# Patient Record
Sex: MALE | Race: WHITE | NOT HISPANIC OR LATINO
[De-identification: names, ages, dates, MRNs, and addresses within clinical notes are randomized per-mention and may not be internally consistent; named-entity substitution may affect disease eponyms.]

---

## 2017-01-11 ENCOUNTER — RX RENEWAL (OUTPATIENT)
Age: 77
End: 2017-01-11

## 2017-01-19 ENCOUNTER — LABORATORY RESULT (OUTPATIENT)
Age: 77
End: 2017-01-19

## 2017-01-25 ENCOUNTER — CLINICAL ADVICE (OUTPATIENT)
Age: 77
End: 2017-01-25

## 2017-02-10 ENCOUNTER — APPOINTMENT (OUTPATIENT)
Dept: HEART AND VASCULAR | Facility: CLINIC | Age: 77
End: 2017-02-10

## 2017-02-10 VITALS
SYSTOLIC BLOOD PRESSURE: 140 MMHG | HEART RATE: 73 BPM | BODY MASS INDEX: 23.66 KG/M2 | OXYGEN SATURATION: 98 % | DIASTOLIC BLOOD PRESSURE: 70 MMHG | RESPIRATION RATE: 12 BRPM | HEIGHT: 65 IN | WEIGHT: 142 LBS

## 2017-03-10 ENCOUNTER — APPOINTMENT (OUTPATIENT)
Dept: HEART AND VASCULAR | Facility: CLINIC | Age: 77
End: 2017-03-10

## 2017-03-10 VITALS
RESPIRATION RATE: 12 BRPM | TEMPERATURE: 96.6 F | SYSTOLIC BLOOD PRESSURE: 120 MMHG | WEIGHT: 143 LBS | HEART RATE: 61 BPM | OXYGEN SATURATION: 98 % | BODY MASS INDEX: 23.8 KG/M2 | DIASTOLIC BLOOD PRESSURE: 60 MMHG

## 2017-03-15 LAB
ALBUMIN SERPL ELPH-MCNC: 4.6 G/DL
ALP BLD-CCNC: 76 U/L
ALT SERPL-CCNC: 27 U/L
ANION GAP SERPL CALC-SCNC: 15 MMOL/L
AST SERPL-CCNC: 27 U/L
BASOPHILS # BLD AUTO: 0.02 K/UL
BASOPHILS NFR BLD AUTO: 0.4 %
BILIRUB SERPL-MCNC: 0.7 MG/DL
BUN SERPL-MCNC: 21 MG/DL
CALCIUM SERPL-MCNC: 9.5 MG/DL
CHLORIDE SERPL-SCNC: 102 MMOL/L
CHOLEST SERPL-MCNC: 192 MG/DL
CHOLEST/HDLC SERPL: 4 RATIO
CO2 SERPL-SCNC: 26 MMOL/L
CREAT SERPL-MCNC: 1.06 MG/DL
EOSINOPHIL # BLD AUTO: 0.1 K/UL
EOSINOPHIL NFR BLD AUTO: 1.8 %
GLUCOSE SERPL-MCNC: 101 MG/DL
HBA1C MFR BLD HPLC: 5.8 %
HCT VFR BLD CALC: 40.3 %
HDLC SERPL-MCNC: 48 MG/DL
HGB BLD-MCNC: 13.4 G/DL
IMM GRANULOCYTES NFR BLD AUTO: 0.4 %
LDLC SERPL CALC-MCNC: 103 MG/DL
LYMPHOCYTES # BLD AUTO: 2.09 K/UL
LYMPHOCYTES NFR BLD AUTO: 36.8 %
MAN DIFF?: NORMAL
MCHC RBC-ENTMCNC: 31.5 PG
MCHC RBC-ENTMCNC: 33.3 GM/DL
MCV RBC AUTO: 94.6 FL
MONOCYTES # BLD AUTO: 0.5 K/UL
MONOCYTES NFR BLD AUTO: 8.8 %
NEUTROPHILS # BLD AUTO: 2.95 K/UL
NEUTROPHILS NFR BLD AUTO: 51.8 %
PLATELET # BLD AUTO: 268 K/UL
POTASSIUM SERPL-SCNC: 3.5 MMOL/L
PROT SERPL-MCNC: 7.2 G/DL
RBC # BLD: 4.26 M/UL
RBC # FLD: 13.5 %
SODIUM SERPL-SCNC: 143 MMOL/L
TRIGL SERPL-MCNC: 207 MG/DL
WBC # FLD AUTO: 5.68 K/UL

## 2017-04-17 ENCOUNTER — APPOINTMENT (OUTPATIENT)
Dept: HEART AND VASCULAR | Facility: CLINIC | Age: 77
End: 2017-04-17

## 2017-04-17 VITALS
RESPIRATION RATE: 12 BRPM | OXYGEN SATURATION: 98 % | DIASTOLIC BLOOD PRESSURE: 70 MMHG | TEMPERATURE: 98.3 F | HEART RATE: 67 BPM | SYSTOLIC BLOOD PRESSURE: 120 MMHG | WEIGHT: 143 LBS | BODY MASS INDEX: 23.8 KG/M2

## 2017-04-18 LAB
ALBUMIN SERPL ELPH-MCNC: 4.4 G/DL
ALP BLD-CCNC: 72 U/L
ALT SERPL-CCNC: 22 U/L
ANION GAP SERPL CALC-SCNC: 19 MMOL/L
AST SERPL-CCNC: 22 U/L
BILIRUB SERPL-MCNC: 0.6 MG/DL
BUN SERPL-MCNC: 25 MG/DL
CALCIUM SERPL-MCNC: 9.6 MG/DL
CHLORIDE SERPL-SCNC: 101 MMOL/L
CHOLEST SERPL-MCNC: 184 MG/DL
CHOLEST/HDLC SERPL: 4.1 RATIO
CO2 SERPL-SCNC: 23 MMOL/L
CREAT SERPL-MCNC: 1.15 MG/DL
GLUCOSE SERPL-MCNC: 105 MG/DL
HBA1C MFR BLD HPLC: 5.8 %
HDLC SERPL-MCNC: 45 MG/DL
LDLC SERPL CALC-MCNC: 101 MG/DL
POTASSIUM SERPL-SCNC: 3.4 MMOL/L
PROT SERPL-MCNC: 7 G/DL
PSA SERPL-MCNC: <0.01 NG/ML
SODIUM SERPL-SCNC: 143 MMOL/L
TRIGL SERPL-MCNC: 188 MG/DL

## 2017-04-24 ENCOUNTER — RX RENEWAL (OUTPATIENT)
Age: 77
End: 2017-04-24

## 2017-05-10 ENCOUNTER — APPOINTMENT (OUTPATIENT)
Dept: HEART AND VASCULAR | Facility: CLINIC | Age: 77
End: 2017-05-10

## 2017-05-10 VITALS
OXYGEN SATURATION: 98 % | HEART RATE: 66 BPM | DIASTOLIC BLOOD PRESSURE: 66 MMHG | WEIGHT: 143 LBS | RESPIRATION RATE: 12 BRPM | TEMPERATURE: 98.3 F | HEIGHT: 65 IN | SYSTOLIC BLOOD PRESSURE: 120 MMHG | BODY MASS INDEX: 23.82 KG/M2

## 2017-05-10 VITALS — DIASTOLIC BLOOD PRESSURE: 70 MMHG | SYSTOLIC BLOOD PRESSURE: 150 MMHG

## 2017-05-10 RX ORDER — POTASSIUM CHLORIDE 1500 MG/1
20 TABLET, EXTENDED RELEASE ORAL DAILY
Qty: 90 | Refills: 0 | Status: DISCONTINUED | COMMUNITY
Start: 2017-04-20 | End: 2017-05-10

## 2017-05-11 ENCOUNTER — APPOINTMENT (OUTPATIENT)
Dept: HEART AND VASCULAR | Facility: CLINIC | Age: 77
End: 2017-05-11

## 2017-05-11 VITALS
HEIGHT: 65 IN | WEIGHT: 143 LBS | RESPIRATION RATE: 12 BRPM | TEMPERATURE: 97.9 F | BODY MASS INDEX: 23.82 KG/M2 | DIASTOLIC BLOOD PRESSURE: 72 MMHG | SYSTOLIC BLOOD PRESSURE: 122 MMHG | OXYGEN SATURATION: 96 % | HEART RATE: 74 BPM

## 2017-05-11 VITALS — SYSTOLIC BLOOD PRESSURE: 140 MMHG | DIASTOLIC BLOOD PRESSURE: 68 MMHG

## 2017-05-23 ENCOUNTER — APPOINTMENT (OUTPATIENT)
Dept: HEART AND VASCULAR | Facility: CLINIC | Age: 77
End: 2017-05-23

## 2017-06-08 ENCOUNTER — RX RENEWAL (OUTPATIENT)
Age: 77
End: 2017-06-08

## 2017-06-26 ENCOUNTER — RX RENEWAL (OUTPATIENT)
Age: 77
End: 2017-06-26

## 2017-08-23 ENCOUNTER — RX RENEWAL (OUTPATIENT)
Age: 77
End: 2017-08-23

## 2017-09-15 ENCOUNTER — APPOINTMENT (OUTPATIENT)
Dept: HEART AND VASCULAR | Facility: CLINIC | Age: 77
End: 2017-09-15
Payer: MEDICARE

## 2017-09-15 VITALS
RESPIRATION RATE: 12 BRPM | HEIGHT: 65 IN | OXYGEN SATURATION: 98 % | HEART RATE: 67 BPM | SYSTOLIC BLOOD PRESSURE: 130 MMHG | DIASTOLIC BLOOD PRESSURE: 64 MMHG | WEIGHT: 143 LBS | TEMPERATURE: 98 F | BODY MASS INDEX: 23.82 KG/M2

## 2017-09-15 DIAGNOSIS — Z87.898 PERSONAL HISTORY OF OTHER SPECIFIED CONDITIONS: ICD-10-CM

## 2017-09-15 DIAGNOSIS — Z86.39 PERSONAL HISTORY OF OTHER ENDOCRINE, NUTRITIONAL AND METABOLIC DISEASE: ICD-10-CM

## 2017-09-15 PROCEDURE — 99214 OFFICE O/P EST MOD 30 MIN: CPT | Mod: 25

## 2017-09-15 PROCEDURE — 36415 COLL VENOUS BLD VENIPUNCTURE: CPT

## 2017-09-15 PROCEDURE — 93000 ELECTROCARDIOGRAM COMPLETE: CPT

## 2017-09-15 RX ORDER — FOLIC ACID 0.8 MG
800 TABLET ORAL
Qty: 180 | Refills: 0 | Status: ACTIVE | COMMUNITY
Start: 2017-09-15

## 2017-09-18 LAB
25(OH)D3 SERPL-MCNC: 46 NG/ML
ALBUMIN SERPL ELPH-MCNC: 4.8 G/DL
ALP BLD-CCNC: 69 U/L
ALT SERPL-CCNC: 23 U/L
ANION GAP SERPL CALC-SCNC: 16 MMOL/L
AST SERPL-CCNC: 22 U/L
BASOPHILS # BLD AUTO: 0.02 K/UL
BASOPHILS NFR BLD AUTO: 0.3 %
BILIRUB SERPL-MCNC: 0.8 MG/DL
BUN SERPL-MCNC: 27 MG/DL
CALCIUM SERPL-MCNC: 9.8 MG/DL
CHLORIDE SERPL-SCNC: 100 MMOL/L
CHOLEST SERPL-MCNC: 185 MG/DL
CHOLEST/HDLC SERPL: 3.2 RATIO
CO2 SERPL-SCNC: 23 MMOL/L
CREAT SERPL-MCNC: 1.34 MG/DL
EOSINOPHIL # BLD AUTO: 0.06 K/UL
EOSINOPHIL NFR BLD AUTO: 1 %
GLUCOSE SERPL-MCNC: 123 MG/DL
HBA1C MFR BLD HPLC: 5.4 %
HCT VFR BLD CALC: 37.3 %
HDLC SERPL-MCNC: 57 MG/DL
HGB BLD-MCNC: 12.2 G/DL
IMM GRANULOCYTES NFR BLD AUTO: 0.2 %
LDLC SERPL CALC-MCNC: 100 MG/DL
LYMPHOCYTES # BLD AUTO: 1.69 K/UL
LYMPHOCYTES NFR BLD AUTO: 27.1 %
MAN DIFF?: NORMAL
MCHC RBC-ENTMCNC: 31 PG
MCHC RBC-ENTMCNC: 32.7 GM/DL
MCV RBC AUTO: 94.7 FL
MONOCYTES # BLD AUTO: 0.5 K/UL
MONOCYTES NFR BLD AUTO: 8 %
NEUTROPHILS # BLD AUTO: 3.96 K/UL
NEUTROPHILS NFR BLD AUTO: 63.4 %
PLATELET # BLD AUTO: 265 K/UL
POTASSIUM SERPL-SCNC: 4 MMOL/L
PROT SERPL-MCNC: 7.5 G/DL
RBC # BLD: 3.94 M/UL
RBC # FLD: 14 %
SODIUM SERPL-SCNC: 139 MMOL/L
TRIGL SERPL-MCNC: 140 MG/DL
TSH SERPL-ACNC: 3.29 UIU/ML
WBC # FLD AUTO: 6.24 K/UL

## 2017-09-20 ENCOUNTER — APPOINTMENT (OUTPATIENT)
Dept: HEART AND VASCULAR | Facility: CLINIC | Age: 77
End: 2017-09-20
Payer: MEDICARE

## 2017-09-20 PROCEDURE — 78452 HT MUSCLE IMAGE SPECT MULT: CPT

## 2017-09-20 PROCEDURE — A9500: CPT

## 2017-09-20 PROCEDURE — 99213 OFFICE O/P EST LOW 20 MIN: CPT | Mod: 25

## 2017-09-20 PROCEDURE — 93015 CV STRESS TEST SUPVJ I&R: CPT

## 2017-09-25 ENCOUNTER — RX RENEWAL (OUTPATIENT)
Age: 77
End: 2017-09-25

## 2017-10-31 ENCOUNTER — RX RENEWAL (OUTPATIENT)
Age: 77
End: 2017-10-31

## 2018-01-08 ENCOUNTER — RX RENEWAL (OUTPATIENT)
Age: 78
End: 2018-01-08

## 2018-01-23 ENCOUNTER — MEDICATION RENEWAL (OUTPATIENT)
Age: 78
End: 2018-01-23

## 2018-05-30 ENCOUNTER — APPOINTMENT (OUTPATIENT)
Dept: HEART AND VASCULAR | Facility: CLINIC | Age: 78
End: 2018-05-30
Payer: MEDICARE

## 2018-05-30 VITALS
TEMPERATURE: 98 F | RESPIRATION RATE: 12 BRPM | OXYGEN SATURATION: 97 % | DIASTOLIC BLOOD PRESSURE: 68 MMHG | WEIGHT: 144 LBS | HEIGHT: 65 IN | SYSTOLIC BLOOD PRESSURE: 116 MMHG | BODY MASS INDEX: 23.99 KG/M2 | HEART RATE: 63 BPM

## 2018-05-30 PROCEDURE — 99214 OFFICE O/P EST MOD 30 MIN: CPT

## 2018-06-22 ENCOUNTER — APPOINTMENT (OUTPATIENT)
Dept: NEUROSURGERY | Facility: CLINIC | Age: 78
End: 2018-06-22
Payer: MEDICARE

## 2018-06-22 VITALS
SYSTOLIC BLOOD PRESSURE: 145 MMHG | TEMPERATURE: 98.4 F | OXYGEN SATURATION: 99 % | WEIGHT: 146 LBS | RESPIRATION RATE: 16 BRPM | BODY MASS INDEX: 24.32 KG/M2 | HEART RATE: 63 BPM | HEIGHT: 65 IN | DIASTOLIC BLOOD PRESSURE: 74 MMHG

## 2018-06-22 PROCEDURE — 99202 OFFICE O/P NEW SF 15 MIN: CPT

## 2018-12-11 ENCOUNTER — RX RENEWAL (OUTPATIENT)
Age: 78
End: 2018-12-11

## 2019-01-08 ENCOUNTER — RX RENEWAL (OUTPATIENT)
Age: 79
End: 2019-01-08

## 2019-02-11 ENCOUNTER — APPOINTMENT (OUTPATIENT)
Dept: HEART AND VASCULAR | Facility: CLINIC | Age: 79
End: 2019-02-11
Payer: MEDICARE

## 2019-02-11 ENCOUNTER — APPOINTMENT (OUTPATIENT)
Dept: HEART AND VASCULAR | Facility: CLINIC | Age: 79
End: 2019-02-11

## 2019-02-11 VITALS
WEIGHT: 145 LBS | SYSTOLIC BLOOD PRESSURE: 128 MMHG | HEART RATE: 70 BPM | BODY MASS INDEX: 24.16 KG/M2 | TEMPERATURE: 97.4 F | HEIGHT: 65 IN | DIASTOLIC BLOOD PRESSURE: 60 MMHG | RESPIRATION RATE: 16 BRPM | OXYGEN SATURATION: 97 %

## 2019-02-11 DIAGNOSIS — E79.0 HYPERURICEMIA W/OUT SIGNS OF INFLAMMATORY ARTHRITIS AND TOPHACEOUS DISEASE: ICD-10-CM

## 2019-02-11 PROCEDURE — 99214 OFFICE O/P EST MOD 30 MIN: CPT

## 2019-02-11 PROCEDURE — 93306 TTE W/DOPPLER COMPLETE: CPT

## 2019-02-11 PROCEDURE — 93000 ELECTROCARDIOGRAM COMPLETE: CPT

## 2019-02-11 PROCEDURE — 36415 COLL VENOUS BLD VENIPUNCTURE: CPT

## 2019-02-11 RX ORDER — CIPROFLOXACIN 3 MG/ML
0.3 SOLUTION OPHTHALMIC
Qty: 10 | Refills: 0 | Status: DISCONTINUED | COMMUNITY
Start: 2017-09-12 | End: 2019-02-11

## 2019-02-11 RX ORDER — MV-MIN/FOLIC/VIT K/LYCOP/COQ10 200-100MCG
CAPSULE ORAL
Refills: 0 | Status: ACTIVE | COMMUNITY

## 2019-02-11 RX ORDER — MENTHOL/CAMPHOR 0.5 %-0.5%
1000 LOTION (ML) TOPICAL
Refills: 0 | Status: ACTIVE | COMMUNITY

## 2019-02-11 NOTE — PHYSICAL EXAM
[General Appearance - Well Developed] : well developed [Normal Appearance] : normal appearance [Well Groomed] : well groomed [General Appearance - Well Nourished] : well nourished [No Deformities] : no deformities [General Appearance - In No Acute Distress] : no acute distress [Normal Conjunctiva] : the conjunctiva exhibited no abnormalities [] : no respiratory distress [Respiration, Rhythm And Depth] : normal respiratory rhythm and effort [Exaggerated Use Of Accessory Muscles For Inspiration] : no accessory muscle use [Heart Sounds] : normal S1 and S2 [Bowel Sounds] : normal bowel sounds [Abdomen Soft] : soft [Abnormal Walk] : normal gait [FreeTextEntry1] : no edema [Skin Turgor] : normal skin turgor [Oriented To Time, Place, And Person] : oriented to person, place, and time [Affect] : the affect was normal [Mood] : the mood was normal [No Anxiety] : not feeling anxious

## 2019-02-11 NOTE — HISTORY OF PRESENT ILLNESS
[FreeTextEntry1] : 78 year male who saw Dr Andrade of Neurosurgery. She had labs with Dr Reilly and is on Vitamins for homocysteine. He is on Vitamin D, B12, B vitamins, B6 and Folic Acid. He denies having any chest pain or SOB

## 2019-02-11 NOTE — DISCUSSION/SUMMARY
[FreeTextEntry1] : At the time of the patient's visit an Echocardiogram was performed to evaluate his LV function. 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 Labs drawn and sent

## 2019-02-12 LAB
25(OH)D3 SERPL-MCNC: 43.4 NG/ML
ALBUMIN SERPL ELPH-MCNC: 4.9 G/DL
ALP BLD-CCNC: 70 U/L
ALT SERPL-CCNC: 22 U/L
ANION GAP SERPL CALC-SCNC: 12 MMOL/L
AST SERPL-CCNC: 22 U/L
BASOPHILS # BLD AUTO: 0.03 K/UL
BASOPHILS NFR BLD AUTO: 0.4 %
BILIRUB SERPL-MCNC: 0.5 MG/DL
BUN SERPL-MCNC: 32 MG/DL
CALCIUM SERPL-MCNC: 9.8 MG/DL
CHLORIDE SERPL-SCNC: 103 MMOL/L
CHOLEST SERPL-MCNC: 167 MG/DL
CHOLEST/HDLC SERPL: 3.2 RATIO
CK SERPL-CCNC: 78 U/L
CO2 SERPL-SCNC: 27 MMOL/L
CREAT SERPL-MCNC: 1.23 MG/DL
EOSINOPHIL # BLD AUTO: 0.16 K/UL
EOSINOPHIL NFR BLD AUTO: 2.2 %
FOLATE SERPL-MCNC: >20 NG/ML
GLUCOSE SERPL-MCNC: 106 MG/DL
HBA1C MFR BLD HPLC: 5.6 %
HCT VFR BLD CALC: 39.9 %
HDLC SERPL-MCNC: 53 MG/DL
HGB BLD-MCNC: 12.9 G/DL
IMM GRANULOCYTES NFR BLD AUTO: 0.3 %
LDLC SERPL CALC-MCNC: 65 MG/DL
LYMPHOCYTES # BLD AUTO: 2.51 K/UL
LYMPHOCYTES NFR BLD AUTO: 34.2 %
MAGNESIUM SERPL-MCNC: 2 MG/DL
MAN DIFF?: NORMAL
MCHC RBC-ENTMCNC: 30.8 PG
MCHC RBC-ENTMCNC: 32.3 GM/DL
MCV RBC AUTO: 95.2 FL
MONOCYTES # BLD AUTO: 0.69 K/UL
MONOCYTES NFR BLD AUTO: 9.4 %
NEUTROPHILS # BLD AUTO: 3.92 K/UL
NEUTROPHILS NFR BLD AUTO: 53.5 %
PLATELET # BLD AUTO: 217 K/UL
POTASSIUM SERPL-SCNC: 3.9 MMOL/L
PROT SERPL-MCNC: 7.2 G/DL
RBC # BLD: 4.19 M/UL
RBC # FLD: 13.8 %
SODIUM SERPL-SCNC: 142 MMOL/L
TRIGL SERPL-MCNC: 247 MG/DL
TSH SERPL-ACNC: 3.63 UIU/ML
URATE SERPL-MCNC: 6.2 MG/DL
VIT B12 SERPL-MCNC: 963 PG/ML
WBC # FLD AUTO: 7.33 K/UL

## 2019-04-11 ENCOUNTER — RX RENEWAL (OUTPATIENT)
Age: 79
End: 2019-04-11

## 2019-06-20 ENCOUNTER — APPOINTMENT (OUTPATIENT)
Dept: HEART AND VASCULAR | Facility: CLINIC | Age: 79
End: 2019-06-20
Payer: MEDICARE

## 2019-06-20 VITALS
TEMPERATURE: 97.7 F | HEART RATE: 66 BPM | WEIGHT: 146.04 LBS | DIASTOLIC BLOOD PRESSURE: 62 MMHG | OXYGEN SATURATION: 97 % | SYSTOLIC BLOOD PRESSURE: 124 MMHG | RESPIRATION RATE: 16 BRPM | BODY MASS INDEX: 24.33 KG/M2 | HEIGHT: 65 IN

## 2019-06-20 DIAGNOSIS — I63.9 CEREBRAL INFARCTION, UNSPECIFIED: ICD-10-CM

## 2019-06-20 PROCEDURE — 99214 OFFICE O/P EST MOD 30 MIN: CPT

## 2019-06-20 PROCEDURE — 36415 COLL VENOUS BLD VENIPUNCTURE: CPT

## 2019-06-20 RX ORDER — ASPIRIN 81 MG/1
81 TABLET ORAL DAILY
Qty: 30 | Refills: 2 | Status: ACTIVE | COMMUNITY
Start: 2018-05-09

## 2019-06-20 NOTE — DISCUSSION/SUMMARY
[FreeTextEntry1] : CAD, LBBB, renal insufficiency--Meet Dr Lopez in consultation. Labs drawn and sent. I asked him to return for Lexiscan Nuclear Stress Test

## 2019-06-20 NOTE — HISTORY OF PRESENT ILLNESS
[FreeTextEntry1] : 78 year male who reports needing refills of his medications. His Nephrologist, Dr Donovan Vaca retired. He has seen Dr Reilly of Hematology. He denies having any chest pain, SOB, dizziness or palpitations, weight gain. He denies PND or Orthopnea. He is walking regularly

## 2019-06-21 ENCOUNTER — TRANSCRIPTION ENCOUNTER (OUTPATIENT)
Age: 79
End: 2019-06-21

## 2019-06-21 LAB
25(OH)D3 SERPL-MCNC: 43.3 NG/ML
ALBUMIN SERPL ELPH-MCNC: 5 G/DL
ALP BLD-CCNC: 77 U/L
ALT SERPL-CCNC: 24 U/L
ANION GAP SERPL CALC-SCNC: 16 MMOL/L
APPEARANCE: CLEAR
AST SERPL-CCNC: 25 U/L
BASOPHILS # BLD AUTO: 0.05 K/UL
BASOPHILS NFR BLD AUTO: 0.7 %
BILIRUB SERPL-MCNC: 0.6 MG/DL
BILIRUBIN URINE: NEGATIVE
BLOOD URINE: NEGATIVE
BUN SERPL-MCNC: 34 MG/DL
CALCIUM SERPL-MCNC: 9.7 MG/DL
CHLORIDE SERPL-SCNC: 102 MMOL/L
CHOLEST SERPL-MCNC: 186 MG/DL
CHOLEST/HDLC SERPL: 3.8 RATIO
CO2 SERPL-SCNC: 24 MMOL/L
COLOR: NORMAL
CREAT SERPL-MCNC: 1.3 MG/DL
EOSINOPHIL # BLD AUTO: 0.18 K/UL
EOSINOPHIL NFR BLD AUTO: 2.5 %
ESTIMATED AVERAGE GLUCOSE: 114 MG/DL
GLUCOSE QUALITATIVE U: NEGATIVE
GLUCOSE SERPL-MCNC: 107 MG/DL
HBA1C MFR BLD HPLC: 5.6 %
HCT VFR BLD CALC: 41.8 %
HCYS SERPL-MCNC: 16.4 UMOL/L
HDLC SERPL-MCNC: 49 MG/DL
HGB BLD-MCNC: 13.4 G/DL
IMM GRANULOCYTES NFR BLD AUTO: 0.3 %
KETONES URINE: NEGATIVE
LDLC SERPL CALC-MCNC: 95 MG/DL
LEUKOCYTE ESTERASE URINE: NEGATIVE
LYMPHOCYTES # BLD AUTO: 2.3 K/UL
LYMPHOCYTES NFR BLD AUTO: 32.4 %
MAN DIFF?: NORMAL
MCHC RBC-ENTMCNC: 31.8 PG
MCHC RBC-ENTMCNC: 32.1 GM/DL
MCV RBC AUTO: 99.3 FL
MONOCYTES # BLD AUTO: 0.62 K/UL
MONOCYTES NFR BLD AUTO: 8.7 %
NEUTROPHILS # BLD AUTO: 3.93 K/UL
NEUTROPHILS NFR BLD AUTO: 55.4 %
NITRITE URINE: NEGATIVE
PH URINE: 6.5
PLATELET # BLD AUTO: 231 K/UL
POTASSIUM SERPL-SCNC: 3.9 MMOL/L
PROT SERPL-MCNC: 7.3 G/DL
PROTEIN URINE: NEGATIVE
RBC # BLD: 4.21 M/UL
RBC # FLD: 13.2 %
SODIUM SERPL-SCNC: 142 MMOL/L
SPECIFIC GRAVITY URINE: 1.01
TRIGL SERPL-MCNC: 208 MG/DL
TSH SERPL-ACNC: 4.18 UIU/ML
UROBILINOGEN URINE: NORMAL
WBC # FLD AUTO: 7.1 K/UL

## 2019-09-04 ENCOUNTER — APPOINTMENT (OUTPATIENT)
Dept: HEART AND VASCULAR | Facility: CLINIC | Age: 79
End: 2019-09-04
Payer: MEDICARE

## 2019-09-04 PROCEDURE — 93015 CV STRESS TEST SUPVJ I&R: CPT

## 2019-09-04 PROCEDURE — 78452 HT MUSCLE IMAGE SPECT MULT: CPT

## 2019-09-04 PROCEDURE — A9500: CPT

## 2019-09-04 NOTE — HISTORY OF PRESENT ILLNESS
[FreeTextEntry1] : 78 year male with CAD who notes exercising. He prefers to exercise rather than pharm stress test

## 2019-09-04 NOTE — PHYSICAL EXAM
[General Appearance - Well Developed] : well developed [Normal Appearance] : normal appearance [Well Groomed] : well groomed [No Deformities] : no deformities [General Appearance - Well Nourished] : well nourished [General Appearance - In No Acute Distress] : no acute distress [Normal Conjunctiva] : the conjunctiva exhibited no abnormalities [] : no respiratory distress [Respiration, Rhythm And Depth] : normal respiratory rhythm and effort [Auscultation Breath Sounds / Voice Sounds] : lungs were clear to auscultation bilaterally [Exaggerated Use Of Accessory Muscles For Inspiration] : no accessory muscle use [Heart Sounds] : normal S1 and S2 [Abnormal Walk] : normal gait [Skin Turgor] : normal skin turgor [Oriented To Time, Place, And Person] : oriented to person, place, and time [Affect] : the affect was normal [Mood] : the mood was normal [No Anxiety] : not feeling anxious [FreeTextEntry1] : no edema

## 2019-09-04 NOTE — DISCUSSION/SUMMARY
[FreeTextEntry1] : At the time of the patient's visit a Nuclear Stress Test was performed to evaluate for exercise induced arrhythmia and ischemia.\par \par  At the time of the visit the results were reviewed with patient. Continued risk factor reduction

## 2019-09-09 ENCOUNTER — APPOINTMENT (OUTPATIENT)
Dept: INTERNAL MEDICINE | Facility: CLINIC | Age: 79
End: 2019-09-09
Payer: MEDICARE

## 2019-09-09 VITALS
HEIGHT: 65 IN | HEART RATE: 73 BPM | BODY MASS INDEX: 24.32 KG/M2 | WEIGHT: 146 LBS | DIASTOLIC BLOOD PRESSURE: 60 MMHG | SYSTOLIC BLOOD PRESSURE: 110 MMHG | TEMPERATURE: 98 F | OXYGEN SATURATION: 98 % | RESPIRATION RATE: 16 BRPM

## 2019-09-09 DIAGNOSIS — Z00.00 ENCOUNTER FOR GENERAL ADULT MEDICAL EXAMINATION W/OUT ABNORMAL FINDINGS: ICD-10-CM

## 2019-09-09 PROCEDURE — 36415 COLL VENOUS BLD VENIPUNCTURE: CPT

## 2019-09-09 PROCEDURE — 99214 OFFICE O/P EST MOD 30 MIN: CPT

## 2019-09-09 NOTE — PLAN
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

## 2019-09-09 NOTE — PHYSICAL EXAM
[Normal] : no jugular venous distention, supple, no lymphadenopathy and the thyroid was normal and there were no nodules present

## 2019-09-09 NOTE — HISTORY OF PRESENT ILLNESS
[de-identified] : This is a 79 yo M with HTN, CAD, HLD, CKD 3, hx of Prostate CA\par On both eplerenone and chlorthalidone 1/2 tab of 25 mg.  \par Failed generic Eplerenone - so takes brand only.  \par He reports he is taking folic acid, B12 and B6 as well.  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

## 2019-09-10 LAB
ANION GAP SERPL CALC-SCNC: 14 MMOL/L
BUN SERPL-MCNC: 27 MG/DL
CALCIUM SERPL-MCNC: 10.1 MG/DL
CHLORIDE SERPL-SCNC: 99 MMOL/L
CO2 SERPL-SCNC: 26 MMOL/L
CREAT SERPL-MCNC: 1.3 MG/DL
GLUCOSE SERPL-MCNC: 106 MG/DL
MAGNESIUM SERPL-MCNC: 2.1 MG/DL
POTASSIUM SERPL-SCNC: 3.9 MMOL/L
SODIUM SERPL-SCNC: 139 MMOL/L

## 2019-11-24 ENCOUNTER — TRANSCRIPTION ENCOUNTER (OUTPATIENT)
Age: 79
End: 2019-11-24

## 2019-11-24 ENCOUNTER — EMERGENCY (EMERGENCY)
Facility: HOSPITAL | Age: 79
LOS: 1 days | Discharge: HOME | End: 2019-11-24
Attending: EMERGENCY MEDICINE | Admitting: EMERGENCY MEDICINE
Payer: MEDICARE

## 2019-11-24 VITALS
HEART RATE: 67 BPM | OXYGEN SATURATION: 97 % | RESPIRATION RATE: 18 BRPM | DIASTOLIC BLOOD PRESSURE: 67 MMHG | TEMPERATURE: 98 F | SYSTOLIC BLOOD PRESSURE: 122 MMHG

## 2019-11-24 VITALS
WEIGHT: 143.96 LBS | DIASTOLIC BLOOD PRESSURE: 72 MMHG | OXYGEN SATURATION: 98 % | TEMPERATURE: 98 F | HEART RATE: 90 BPM | SYSTOLIC BLOOD PRESSURE: 158 MMHG | RESPIRATION RATE: 18 BRPM | HEIGHT: 65 IN

## 2019-11-24 DIAGNOSIS — N49.2 INFLAMMATORY DISORDERS OF SCROTUM: ICD-10-CM

## 2019-11-24 DIAGNOSIS — N43.3 HYDROCELE, UNSPECIFIED: ICD-10-CM

## 2019-11-24 DIAGNOSIS — N50.82 SCROTAL PAIN: ICD-10-CM

## 2019-11-24 LAB
ALBUMIN SERPL ELPH-MCNC: 4.7 G/DL — SIGNIFICANT CHANGE UP (ref 3.3–5)
ALP SERPL-CCNC: 76 U/L — SIGNIFICANT CHANGE UP (ref 40–120)
ALT FLD-CCNC: 22 U/L — SIGNIFICANT CHANGE UP (ref 10–45)
ANION GAP SERPL CALC-SCNC: 13 MMOL/L — SIGNIFICANT CHANGE UP (ref 5–17)
APPEARANCE UR: CLEAR — SIGNIFICANT CHANGE UP
AST SERPL-CCNC: 33 U/L — SIGNIFICANT CHANGE UP (ref 10–40)
BASOPHILS # BLD AUTO: 0.02 K/UL — SIGNIFICANT CHANGE UP (ref 0–0.2)
BASOPHILS NFR BLD AUTO: 0.2 % — SIGNIFICANT CHANGE UP (ref 0–2)
BILIRUB SERPL-MCNC: 0.6 MG/DL — SIGNIFICANT CHANGE UP (ref 0.2–1.2)
BILIRUB UR-MCNC: NEGATIVE — SIGNIFICANT CHANGE UP
BUN SERPL-MCNC: 30 MG/DL — HIGH (ref 7–23)
CALCIUM SERPL-MCNC: 10 MG/DL — SIGNIFICANT CHANGE UP (ref 8.4–10.5)
CHLORIDE SERPL-SCNC: 102 MMOL/L — SIGNIFICANT CHANGE UP (ref 96–108)
CO2 SERPL-SCNC: 25 MMOL/L — SIGNIFICANT CHANGE UP (ref 22–31)
COLOR SPEC: YELLOW — SIGNIFICANT CHANGE UP
CREAT SERPL-MCNC: 1.34 MG/DL — HIGH (ref 0.5–1.3)
DIFF PNL FLD: NEGATIVE — SIGNIFICANT CHANGE UP
EOSINOPHIL # BLD AUTO: 0.03 K/UL — SIGNIFICANT CHANGE UP (ref 0–0.5)
EOSINOPHIL NFR BLD AUTO: 0.4 % — SIGNIFICANT CHANGE UP (ref 0–6)
GLUCOSE SERPL-MCNC: 141 MG/DL — HIGH (ref 70–99)
GLUCOSE UR QL: NEGATIVE — SIGNIFICANT CHANGE UP
HCT VFR BLD CALC: 39.7 % — SIGNIFICANT CHANGE UP (ref 39–50)
HGB BLD-MCNC: 13.3 G/DL — SIGNIFICANT CHANGE UP (ref 13–17)
IMM GRANULOCYTES NFR BLD AUTO: 0.4 % — SIGNIFICANT CHANGE UP (ref 0–1.5)
KETONES UR-MCNC: NEGATIVE — SIGNIFICANT CHANGE UP
LEUKOCYTE ESTERASE UR-ACNC: NEGATIVE — SIGNIFICANT CHANGE UP
LYMPHOCYTES # BLD AUTO: 1.01 K/UL — SIGNIFICANT CHANGE UP (ref 1–3.3)
LYMPHOCYTES # BLD AUTO: 12.1 % — LOW (ref 13–44)
MCHC RBC-ENTMCNC: 31.8 PG — SIGNIFICANT CHANGE UP (ref 27–34)
MCHC RBC-ENTMCNC: 33.5 GM/DL — SIGNIFICANT CHANGE UP (ref 32–36)
MCV RBC AUTO: 95 FL — SIGNIFICANT CHANGE UP (ref 80–100)
MONOCYTES # BLD AUTO: 0.54 K/UL — SIGNIFICANT CHANGE UP (ref 0–0.9)
MONOCYTES NFR BLD AUTO: 6.5 % — SIGNIFICANT CHANGE UP (ref 2–14)
NEUTROPHILS # BLD AUTO: 6.7 K/UL — SIGNIFICANT CHANGE UP (ref 1.8–7.4)
NEUTROPHILS NFR BLD AUTO: 80.4 % — HIGH (ref 43–77)
NITRITE UR-MCNC: NEGATIVE — SIGNIFICANT CHANGE UP
NRBC # BLD: 0 /100 WBCS — SIGNIFICANT CHANGE UP (ref 0–0)
PH UR: 6 — SIGNIFICANT CHANGE UP (ref 5–8)
PLATELET # BLD AUTO: 217 K/UL — SIGNIFICANT CHANGE UP (ref 150–400)
POTASSIUM SERPL-MCNC: 4.2 MMOL/L — SIGNIFICANT CHANGE UP (ref 3.5–5.3)
POTASSIUM SERPL-SCNC: 4.2 MMOL/L — SIGNIFICANT CHANGE UP (ref 3.5–5.3)
PROT SERPL-MCNC: 7.6 G/DL — SIGNIFICANT CHANGE UP (ref 6–8.3)
PROT UR-MCNC: NEGATIVE MG/DL — SIGNIFICANT CHANGE UP
RBC # BLD: 4.18 M/UL — LOW (ref 4.2–5.8)
RBC # FLD: 12.5 % — SIGNIFICANT CHANGE UP (ref 10.3–14.5)
SODIUM SERPL-SCNC: 140 MMOL/L — SIGNIFICANT CHANGE UP (ref 135–145)
SP GR SPEC: 1.02 — SIGNIFICANT CHANGE UP (ref 1–1.03)
UROBILINOGEN FLD QL: 0.2 E.U./DL — SIGNIFICANT CHANGE UP
WBC # BLD: 8.33 K/UL — SIGNIFICANT CHANGE UP (ref 3.8–10.5)
WBC # FLD AUTO: 8.33 K/UL — SIGNIFICANT CHANGE UP (ref 3.8–10.5)

## 2019-11-24 PROCEDURE — 85025 COMPLETE CBC W/AUTO DIFF WBC: CPT

## 2019-11-24 PROCEDURE — 96374 THER/PROPH/DIAG INJ IV PUSH: CPT

## 2019-11-24 PROCEDURE — 87086 URINE CULTURE/COLONY COUNT: CPT

## 2019-11-24 PROCEDURE — 81003 URINALYSIS AUTO W/O SCOPE: CPT

## 2019-11-24 PROCEDURE — 99284 EMERGENCY DEPT VISIT MOD MDM: CPT

## 2019-11-24 PROCEDURE — 76870 US EXAM SCROTUM: CPT | Mod: 26

## 2019-11-24 PROCEDURE — 36415 COLL VENOUS BLD VENIPUNCTURE: CPT

## 2019-11-24 PROCEDURE — 76870 US EXAM SCROTUM: CPT

## 2019-11-24 PROCEDURE — 99284 EMERGENCY DEPT VISIT MOD MDM: CPT | Mod: 25

## 2019-11-24 PROCEDURE — 80053 COMPREHEN METABOLIC PANEL: CPT

## 2019-11-24 RX ORDER — AMPICILLIN SODIUM AND SULBACTAM SODIUM 250; 125 MG/ML; MG/ML
3 INJECTION, POWDER, FOR SUSPENSION INTRAMUSCULAR; INTRAVENOUS ONCE
Refills: 0 | Status: COMPLETED | OUTPATIENT
Start: 2019-11-24 | End: 2019-11-24

## 2019-11-24 RX ORDER — SODIUM CHLORIDE 9 MG/ML
1000 INJECTION INTRAMUSCULAR; INTRAVENOUS; SUBCUTANEOUS ONCE
Refills: 0 | Status: COMPLETED | OUTPATIENT
Start: 2019-11-24 | End: 2019-11-24

## 2019-11-24 RX ADMIN — SODIUM CHLORIDE 1000 MILLILITER(S): 9 INJECTION INTRAMUSCULAR; INTRAVENOUS; SUBCUTANEOUS at 19:36

## 2019-11-24 RX ADMIN — AMPICILLIN SODIUM AND SULBACTAM SODIUM 200 GRAM(S): 250; 125 INJECTION, POWDER, FOR SUSPENSION INTRAMUSCULAR; INTRAVENOUS at 20:15

## 2019-11-24 NOTE — ED ADULT NURSE NOTE - OBJECTIVE STATEMENT
pt received into spot 6 A&Ox3 ambulatory appears comfortable arrives via walk in triage for eval of blood noted on testicular area first noticed today. Wife also states she saw some blood on his shower towel yesterday. Pt denies any pain trauma/ injury to area. Denies any blood in urine painful urination or difficulty urinating. Does not remember scratching, no skin tear or laceration noted upon assessment. Left testicular appears larger than right pt states he thinks this is chronic "for a while". Hx prostate CA 8-10 yrs ago no chemo/ radiation only surgical intervention without complications afterward. Pt takes daily aspirin no other blood thinner. Abd soft nondistended awaiting Md meléndez in NAD informed and agreeable to plan will monitor and reassess

## 2019-11-24 NOTE — ED PROVIDER NOTE - CARE PROVIDER_API CALL
Bryn Izaguirre)  Urology  [address] [address]  Phone: [phone]  Fax: [phone]  Follow Up Time: 1-3 Days

## 2019-11-24 NOTE — ED PROVIDER NOTE - OBJECTIVE STATEMENT
78 yo male with left scrotal tenderness ? noted some blood on the left aspect of his scrotum earlier today ? redness no fevers or chills  -no trauma-  no N/V- no flank pain passing urine normally no penile pain no dysuria or freq  no hx of DM

## 2019-11-24 NOTE — ED PROVIDER NOTE - CLINICAL SUMMARY MEDICAL DECISION MAKING FREE TEXT BOX
left hydrocoele - mild erythema- pt thought possible superficial bleeding -- none apparent on exam --left hemiscrotum - no fluctuance or abscess -  creatinine 1.3  given 1 L NS  rec  follow up 1-2 days  recheck bmp in 2-3 days

## 2019-11-24 NOTE — ED PROVIDER NOTE - GENITOURINARY TESTICULAR EXAM, LEFT
? mld left hemiscrotal erythema-no gross tenderness- no overt swelling-  no fluctuance or crepitus- no necrosis

## 2019-11-24 NOTE — ED PROVIDER NOTE - PATIENT PORTAL LINK FT
You can access the FollowMyHealth Patient Portal offered by [facility] by registering at the following website: [email or web address]. By joining [facility]’s FollowMyHealth portal, you will also be able to view your health information using other applications (apps) compatible with our system.

## 2019-11-24 NOTE — ED ADULT NURSE NOTE - CHIEF COMPLAINT QUOTE
Pt is a 78 y/o male who came in to ED for evaluation of "blood in the testicles" Pt denies trauma, denies pain, went to urgent care today and was told to come to ED for US, Pt denies hematuria.

## 2019-11-24 NOTE — ED ADULT TRIAGE NOTE - CHIEF COMPLAINT QUOTE
Pt is a 80 y/o male who came in to ED for evaluation of "blood in the testicles" Pt denies trauma, denies pain, went to urgent care today and was told to come to ED for US, Pt denies hematuria.

## 2019-11-24 NOTE — ED CLERICAL - NS ED CLERK NOTE PRE-ARRIVAL INFORMATION; ADDITIONAL PRE-ARRIVAL INFORMATION
80 y/o M, Dylan Byrd. Sent from [facility] Dr. Dr. Hutchinson. Pt is bleeding from the left scrotum and has that area swollen as well. Pt also has a Hx of prostate Sx.

## 2019-11-25 PROBLEM — C61 MALIGNANT NEOPLASM OF PROSTATE: Chronic | Status: ACTIVE | Noted: 2019-11-24

## 2019-11-25 LAB
CULTURE RESULTS: NO GROWTH — SIGNIFICANT CHANGE UP
SPECIMEN SOURCE: SIGNIFICANT CHANGE UP

## 2019-11-26 ENCOUNTER — APPOINTMENT (OUTPATIENT)
Dept: UROLOGY | Facility: CLINIC | Age: 79
End: 2019-11-26
Payer: MEDICARE

## 2019-11-26 VITALS — DIASTOLIC BLOOD PRESSURE: 67 MMHG | TEMPERATURE: 97.7 F | SYSTOLIC BLOOD PRESSURE: 142 MMHG | HEART RATE: 84 BPM

## 2019-11-26 PROCEDURE — 99203 OFFICE O/P NEW LOW 30 MIN: CPT

## 2019-11-26 NOTE — ASSESSMENT
[FreeTextEntry1] : Hydrocele\par -patient not bothered at this time\par -continue course of antibiotics given in the ER\par \par RTC as needed\par

## 2019-11-26 NOTE — HISTORY OF PRESENT ILLNESS
[FreeTextEntry1] : CC: "Problem with Left Testicle"\par \par Saúl was in the shower and noticed blood on his towel and thought he was bleeding from his "left testicle."  Denied any pain in the scrotal area, no fever, no hematuria.\par Went to [facility] ER for bleeding. Urine culture was negative.  Scrotal US showed large left hydrocele with internal debris.  Currently on Augmentin 875 mg x 7 days\par \par No urinary incontinence after radical prostatectomy\par \par Medical Hx: HTN, history of prostate cancer\par Surgical Hx: radical prostatectomy 10 years ago, cardiac stent

## 2019-11-26 NOTE — PHYSICAL EXAM
[General Appearance - Well Developed] : well developed [General Appearance - Well Nourished] : well nourished [Well Groomed] : well groomed [Normal Appearance] : normal appearance [General Appearance - In No Acute Distress] : no acute distress [Edema] : no peripheral edema [Heart Rate And Rhythm] : Heart rate and rhythm were normal [Abdomen Soft] : soft [Abdomen Tenderness] : non-tender [Abdomen Mass (___ Cm)] : no abdominal mass palpated [Costovertebral Angle Tenderness] : no ~M costovertebral angle tenderness [Testes Tenderness] : no tenderness of the testes [Normal Station and Gait] : the gait and station were normal for the patient's age [No Focal Deficits] : no focal deficits [Oriented To Time, Place, And Person] : oriented to person, place, and time [Mood] : the mood was normal [Affect] : the affect was normal [Not Anxious] : not anxious [FreeTextEntry1] : left hydrocele moderate-large, normal coloration of scrotal sac

## 2019-12-13 ENCOUNTER — MEDICATION RENEWAL (OUTPATIENT)
Age: 79
End: 2019-12-13

## 2019-12-23 ENCOUNTER — RX RENEWAL (OUTPATIENT)
Age: 79
End: 2019-12-23

## 2020-02-03 ENCOUNTER — RX RENEWAL (OUTPATIENT)
Age: 80
End: 2020-02-03

## 2020-02-20 ENCOUNTER — RX RENEWAL (OUTPATIENT)
Age: 80
End: 2020-02-20

## 2020-04-15 ENCOUNTER — RX RENEWAL (OUTPATIENT)
Age: 80
End: 2020-04-15

## 2020-05-19 ENCOUNTER — RX RENEWAL (OUTPATIENT)
Age: 80
End: 2020-05-19

## 2020-06-18 ENCOUNTER — APPOINTMENT (OUTPATIENT)
Dept: HEART AND VASCULAR | Facility: CLINIC | Age: 80
End: 2020-06-18
Payer: MEDICARE

## 2020-06-18 ENCOUNTER — LABORATORY RESULT (OUTPATIENT)
Age: 80
End: 2020-06-18

## 2020-06-18 VITALS
WEIGHT: 142 LBS | TEMPERATURE: 97.5 F | HEART RATE: 86 BPM | HEIGHT: 65 IN | SYSTOLIC BLOOD PRESSURE: 116 MMHG | RESPIRATION RATE: 16 BRPM | OXYGEN SATURATION: 96 % | BODY MASS INDEX: 23.66 KG/M2 | DIASTOLIC BLOOD PRESSURE: 62 MMHG

## 2020-06-18 VITALS — SYSTOLIC BLOOD PRESSURE: 134 MMHG | DIASTOLIC BLOOD PRESSURE: 62 MMHG

## 2020-06-18 DIAGNOSIS — R73.09 OTHER ABNORMAL GLUCOSE: ICD-10-CM

## 2020-06-18 DIAGNOSIS — Z86.79 PERSONAL HISTORY OF OTHER DISEASES OF THE CIRCULATORY SYSTEM: ICD-10-CM

## 2020-06-18 DIAGNOSIS — D64.9 ANEMIA, UNSPECIFIED: ICD-10-CM

## 2020-06-18 DIAGNOSIS — Z86.39 PERSONAL HISTORY OF OTHER ENDOCRINE, NUTRITIONAL AND METABOLIC DISEASE: ICD-10-CM

## 2020-06-18 DIAGNOSIS — Z01.818 ENCOUNTER FOR OTHER PREPROCEDURAL EXAMINATION: ICD-10-CM

## 2020-06-18 PROCEDURE — 93000 ELECTROCARDIOGRAM COMPLETE: CPT

## 2020-06-18 PROCEDURE — 36415 COLL VENOUS BLD VENIPUNCTURE: CPT

## 2020-06-18 PROCEDURE — 99214 OFFICE O/P EST MOD 30 MIN: CPT

## 2020-06-18 RX ORDER — PREDNISOLONE ACETATE 10 MG/ML
1 SUSPENSION/ DROPS OPHTHALMIC
Qty: 10 | Refills: 0 | Status: DISCONTINUED | COMMUNITY
Start: 2017-09-12 | End: 2020-06-18

## 2020-06-18 NOTE — DISCUSSION/SUMMARY
[FreeTextEntry1] : CAD, murmur, prostate cancer, bilateral renal cysts, hypertension--Labs drawn and sent. Followup for Echocardiogram

## 2020-06-18 NOTE — HISTORY OF PRESENT ILLNESS
[FreeTextEntry1] : 79 year male who was seen by Dr Pulido who noted having bulging disks. Probable bilateral renal cysts were seen. He was [place] by [facility] [facility] [place] Dr Izaguirre

## 2020-06-18 NOTE — PHYSICAL EXAM
[General Appearance - Well Developed] : well developed [Normal Appearance] : normal appearance [Well Groomed] : well groomed [No Deformities] : no deformities [General Appearance - Well Nourished] : well nourished [General Appearance - In No Acute Distress] : no acute distress [Normal Conjunctiva] : the conjunctiva exhibited no abnormalities [Respiration, Rhythm And Depth] : normal respiratory rhythm and effort [Auscultation Breath Sounds / Voice Sounds] : lungs were clear to auscultation bilaterally [Exaggerated Use Of Accessory Muscles For Inspiration] : no accessory muscle use [Heart Rate And Rhythm] : heart rate and rhythm were normal [Heart Sounds] : normal S1 and S2 [Abdomen Tenderness] : non-tender [] : no hepato-splenomegaly [Abdomen Soft] : soft [Abnormal Walk] : normal gait [Abdomen Mass (___ Cm)] : no abdominal mass palpated [Skin Turgor] : normal skin turgor [Oriented To Time, Place, And Person] : oriented to person, place, and time [Mood] : the mood was normal [Affect] : the affect was normal [No Anxiety] : not feeling anxious [FreeTextEntry1] : no edema

## 2020-06-19 LAB
25(OH)D3 SERPL-MCNC: 44.5 NG/ML
ALBUMIN SERPL ELPH-MCNC: 5 G/DL
ALP BLD-CCNC: 77 U/L
ALT SERPL-CCNC: 31 U/L
ANION GAP SERPL CALC-SCNC: 15 MMOL/L
APPEARANCE: ABNORMAL
AST SERPL-CCNC: 30 U/L
BASOPHILS # BLD AUTO: 0.05 K/UL
BASOPHILS NFR BLD AUTO: 0.8 %
BILIRUB SERPL-MCNC: 0.6 MG/DL
BILIRUBIN URINE: NEGATIVE
BLOOD URINE: NEGATIVE
BUN SERPL-MCNC: 28 MG/DL
CALCIUM SERPL-MCNC: 9.8 MG/DL
CHLORIDE SERPL-SCNC: 100 MMOL/L
CHOLEST SERPL-MCNC: 206 MG/DL
CHOLEST/HDLC SERPL: 4.3 RATIO
CK SERPL-CCNC: 85 U/L
CO2 SERPL-SCNC: 26 MMOL/L
COLOR: YELLOW
CREAT SERPL-MCNC: 1.34 MG/DL
EOSINOPHIL # BLD AUTO: 0.2 K/UL
EOSINOPHIL NFR BLD AUTO: 3.1 %
ESTIMATED AVERAGE GLUCOSE: 108 MG/DL
FOLATE SERPL-MCNC: >20 NG/ML
GLUCOSE QUALITATIVE U: NEGATIVE
GLUCOSE SERPL-MCNC: 111 MG/DL
HBA1C MFR BLD HPLC: 5.4 %
HCT VFR BLD CALC: 41.1 %
HCYS SERPL-MCNC: 17 UMOL/L
HDLC SERPL-MCNC: 48 MG/DL
HGB BLD-MCNC: 13 G/DL
IMM GRANULOCYTES NFR BLD AUTO: 0.3 %
KETONES URINE: NEGATIVE
LDLC SERPL CALC-MCNC: 114 MG/DL
LEUKOCYTE ESTERASE URINE: NEGATIVE
LYMPHOCYTES # BLD AUTO: 2.03 K/UL
LYMPHOCYTES NFR BLD AUTO: 31.2 %
MAN DIFF?: NORMAL
MCHC RBC-ENTMCNC: 31.6 GM/DL
MCHC RBC-ENTMCNC: 32.1 PG
MCV RBC AUTO: 101.5 FL
MONOCYTES # BLD AUTO: 0.58 K/UL
MONOCYTES NFR BLD AUTO: 8.9 %
NEUTROPHILS # BLD AUTO: 3.63 K/UL
NEUTROPHILS NFR BLD AUTO: 55.7 %
NITRITE URINE: NEGATIVE
PH URINE: 6
PLATELET # BLD AUTO: 240 K/UL
POTASSIUM SERPL-SCNC: 3.8 MMOL/L
PROT SERPL-MCNC: 7.2 G/DL
PROTEIN URINE: NEGATIVE
PSA SERPL-MCNC: <0.01 NG/ML
RBC # BLD: 4.05 M/UL
RBC # FLD: 13.3 %
SARS-COV-2 IGG SERPL IA-ACNC: 0.01 INDEX
SARS-COV-2 IGG SERPL QL IA: NEGATIVE
SODIUM SERPL-SCNC: 141 MMOL/L
SPECIFIC GRAVITY URINE: 1.02
TRIGL SERPL-MCNC: 222 MG/DL
TSH SERPL-ACNC: 3.72 UIU/ML
URATE SERPL-MCNC: 7.3 MG/DL
UROBILINOGEN URINE: NORMAL
VIT B12 SERPL-MCNC: 1047 PG/ML
WBC # FLD AUTO: 6.51 K/UL

## 2020-08-11 ENCOUNTER — APPOINTMENT (OUTPATIENT)
Dept: HEART AND VASCULAR | Facility: CLINIC | Age: 80
End: 2020-08-11
Payer: MEDICARE

## 2020-08-11 VITALS
WEIGHT: 145 LBS | TEMPERATURE: 98.1 F | SYSTOLIC BLOOD PRESSURE: 118 MMHG | DIASTOLIC BLOOD PRESSURE: 64 MMHG | HEIGHT: 65 IN | OXYGEN SATURATION: 97 % | RESPIRATION RATE: 16 BRPM | HEART RATE: 83 BPM | BODY MASS INDEX: 24.16 KG/M2

## 2020-08-11 PROCEDURE — 99214 OFFICE O/P EST MOD 30 MIN: CPT

## 2020-08-11 PROCEDURE — 93306 TTE W/DOPPLER COMPLETE: CPT

## 2020-08-11 NOTE — HISTORY OF PRESENT ILLNESS
[FreeTextEntry1] : 79 year male who was noted to have renal cyst on a CT performed for his back. He plans to followup with Dr Izaguirre

## 2020-08-11 NOTE — DISCUSSION/SUMMARY
[FreeTextEntry1] : At the time of the patient's visit an Echocardiogram was performed to evaluate LV function. 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

## 2020-08-11 NOTE — PHYSICAL EXAM
[Normal Appearance] : normal appearance [General Appearance - Well Developed] : well developed [Well Groomed] : well groomed [General Appearance - Well Nourished] : well nourished [No Deformities] : no deformities [General Appearance - In No Acute Distress] : no acute distress [Normal Conjunctiva] : the conjunctiva exhibited no abnormalities [] : no respiratory distress [Respiration, Rhythm And Depth] : normal respiratory rhythm and effort [Exaggerated Use Of Accessory Muscles For Inspiration] : no accessory muscle use [Auscultation Breath Sounds / Voice Sounds] : lungs were clear to auscultation bilaterally [Heart Sounds] : normal S1 and S2 [Abnormal Walk] : normal gait [Skin Turgor] : normal skin turgor [Oriented To Time, Place, And Person] : oriented to person, place, and time [Mood] : the mood was normal [Affect] : the affect was normal [No Anxiety] : not feeling anxious

## 2020-11-04 NOTE — ED PROVIDER NOTE - PSYCHIATRIC NEGATIVE STATEMENT, MLM
Department of Orthopedic Surgery  Resident Progress Note    Patient seen and examined. Pain controlled. No new complaints. Denies chest pain, shortness of breath, calf pain, dizziness/lightheadedness. VITALS:  BP (!) 113/57   Pulse 92   Temp 97.8 °F (36.6 °C) (Oral)   Resp 18   Ht 5' 8\" (1.727 m)   Wt 179 lb (81.2 kg)   SpO2 93%   BMI 27.22 kg/m²     GENERAL: alert and oriented  MUSCULOSKELETAL:   right lower extremity:  · Dressing C/D/I  · Compartments soft and compressible, calf non-tender  · Palpable dorsalis pedis and posterior tibialis pulse, brisk cap refill to toes, foot warm and perfused  · Sensation intact to light touch in sural/deep peroneal/superficial peroneal/saphenous/posterior tibial nerve distributions to foot/ankle.   · Demonstrates active ankle plantar/dorsiflexion/great toe extension    CBC:   Lab Results   Component Value Date    WBC 6.9 10/22/2020    HGB 12.8 11/04/2020    HCT 38.2 11/04/2020     10/22/2020       ASSESSMENT  · S/p R TKA 11/3    PLAN    WBAT  Pain control IV & PO  DVT prophylaxis- aspirin, early mobilization  Monitor Labs  Bowel regiment  Pulmonary hygiene   Trend H&H- 12.8  24 hr post op ABX   PT/OT  D/C planning, SW/PT recs - okay for discharge today pending therapy eval  Discuss with attending  · no known mental health issues.

## 2020-11-24 ENCOUNTER — RX RENEWAL (OUTPATIENT)
Age: 80
End: 2020-11-24

## 2020-12-15 ENCOUNTER — RX RENEWAL (OUTPATIENT)
Age: 80
End: 2020-12-15

## 2021-01-05 ENCOUNTER — TRANSCRIPTION ENCOUNTER (OUTPATIENT)
Age: 81
End: 2021-01-05

## 2021-01-22 ENCOUNTER — RX RENEWAL (OUTPATIENT)
Age: 81
End: 2021-01-22

## 2021-02-08 ENCOUNTER — TRANSCRIPTION ENCOUNTER (OUTPATIENT)
Age: 81
End: 2021-02-08

## 2021-03-08 ENCOUNTER — RX RENEWAL (OUTPATIENT)
Age: 81
End: 2021-03-08

## 2021-04-22 ENCOUNTER — RX RENEWAL (OUTPATIENT)
Age: 81
End: 2021-04-22

## 2021-04-23 ENCOUNTER — APPOINTMENT (OUTPATIENT)
Dept: HEART AND VASCULAR | Facility: CLINIC | Age: 81
End: 2021-04-23
Payer: MEDICARE

## 2021-04-23 VITALS — DIASTOLIC BLOOD PRESSURE: 80 MMHG | SYSTOLIC BLOOD PRESSURE: 124 MMHG

## 2021-04-23 VITALS
WEIGHT: 143 LBS | RESPIRATION RATE: 16 BRPM | HEIGHT: 65 IN | BODY MASS INDEX: 23.82 KG/M2 | OXYGEN SATURATION: 96 % | HEART RATE: 84 BPM | SYSTOLIC BLOOD PRESSURE: 142 MMHG | TEMPERATURE: 97.1 F | DIASTOLIC BLOOD PRESSURE: 62 MMHG

## 2021-04-23 DIAGNOSIS — M70.30 OTHER BURSITIS OF ELBOW, UNSPECIFIED ELBOW: ICD-10-CM

## 2021-04-23 PROCEDURE — 99214 OFFICE O/P EST MOD 30 MIN: CPT

## 2021-04-23 PROCEDURE — 36415 COLL VENOUS BLD VENIPUNCTURE: CPT

## 2021-04-23 PROCEDURE — 93000 ELECTROCARDIOGRAM COMPLETE: CPT

## 2021-04-23 NOTE — ASSESSMENT
[FreeTextEntry1] : bursitis, CAD, prostate cancer, renal insufficiency--labs drawn and sent. ice, ortho evaluation with Dr Blanco.  We discussed using Lipitor 80 mg based on current GDMT (guideline directed medical therapy). He understands but decided to remain on his current dose

## 2021-04-23 NOTE — HISTORY OF PRESENT ILLNESS
[FreeTextEntry1] : 80 year male with CAD, renal insufficiency and prostate Cancer who notes a s painless welling at his right elbow. He notes feeling depressed by the limitations of the pandemic but no chest pain, SOB, BURGER, dizziness, palpitations, orthopnea, PND or syncope

## 2021-04-23 NOTE — PHYSICAL EXAM
[Skin Turgor] : normal skin turgor [Oriented To Time, Place, And Person] : oriented to person, place, and time [Affect] : the affect was normal [Mood] : the mood was normal [No Anxiety] : not feeling anxious [General Appearance - Well Developed] : well developed [Normal Appearance] : normal appearance [Well Groomed] : well groomed [General Appearance - Well Nourished] : well nourished [No Deformities] : no deformities [General Appearance - In No Acute Distress] : no acute distress [Normal Conjunctiva] : the conjunctiva exhibited no abnormalities [] : no respiratory distress [Respiration, Rhythm And Depth] : normal respiratory rhythm and effort [Exaggerated Use Of Accessory Muscles For Inspiration] : no accessory muscle use [Auscultation Breath Sounds / Voice Sounds] : lungs were clear to auscultation bilaterally [Heart Sounds] : normal S1 and S2 [Abnormal Walk] : normal gait [FreeTextEntry1] : soft mobile lesion right elbow without any warmth or erythema. no obvious puncture or abrasion

## 2021-05-24 ENCOUNTER — RX RENEWAL (OUTPATIENT)
Age: 81
End: 2021-05-24

## 2021-05-24 ENCOUNTER — TRANSCRIPTION ENCOUNTER (OUTPATIENT)
Age: 81
End: 2021-05-24

## 2021-05-26 ENCOUNTER — NON-APPOINTMENT (OUTPATIENT)
Age: 81
End: 2021-05-26

## 2021-05-26 LAB
CHOLEST SERPL-MCNC: 184 MG/DL
HDLC SERPL-MCNC: 45 MG/DL
LDLC SERPL CALC-MCNC: 87 MG/DL
NONHDLC SERPL-MCNC: 139 MG/DL
TRIGL SERPL-MCNC: 261 MG/DL

## 2021-06-07 ENCOUNTER — RX RENEWAL (OUTPATIENT)
Age: 81
End: 2021-06-07

## 2021-07-06 ENCOUNTER — RX RENEWAL (OUTPATIENT)
Age: 81
End: 2021-07-06

## 2021-07-21 ENCOUNTER — RX RENEWAL (OUTPATIENT)
Age: 81
End: 2021-07-21

## 2021-07-23 ENCOUNTER — APPOINTMENT (OUTPATIENT)
Dept: HEART AND VASCULAR | Facility: CLINIC | Age: 81
End: 2021-07-23
Payer: MEDICARE

## 2021-07-23 VITALS
SYSTOLIC BLOOD PRESSURE: 104 MMHG | WEIGHT: 146.06 LBS | HEIGHT: 65 IN | DIASTOLIC BLOOD PRESSURE: 60 MMHG | OXYGEN SATURATION: 96 % | HEART RATE: 80 BPM | TEMPERATURE: 98 F | BODY MASS INDEX: 24.33 KG/M2 | RESPIRATION RATE: 16 BRPM

## 2021-07-23 DIAGNOSIS — E72.11 HOMOCYSTINURIA: ICD-10-CM

## 2021-07-23 DIAGNOSIS — N18.30 CHRONIC KIDNEY DISEASE, STAGE 3 UNSPECIFIED: ICD-10-CM

## 2021-07-23 PROCEDURE — 99213 OFFICE O/P EST LOW 20 MIN: CPT

## 2021-07-23 PROCEDURE — 36415 COLL VENOUS BLD VENIPUNCTURE: CPT

## 2021-07-23 NOTE — PHYSICAL EXAM
[Normal Gait] : normal gait [Moves all extremities] : moves all extremities [Normal] : alert and oriented, normal memory

## 2021-07-23 NOTE — HISTORY OF PRESENT ILLNESS
[FreeTextEntry1] : 80 year male who returns for fasting labs when well hydrated. He is aware of indications to increase his Atorvastatin to 80 mg daily but defers it. He request to check a homocysteine level. We discussed possible increase in his statin if his homocysteine is elevated but he would not commit

## 2021-07-26 LAB
ALBUMIN SERPL ELPH-MCNC: 4.7 G/DL
ALP BLD-CCNC: 75 U/L
ALT SERPL-CCNC: 18 U/L
ANION GAP SERPL CALC-SCNC: 12 MMOL/L
AST SERPL-CCNC: 24 U/L
BILIRUB SERPL-MCNC: 0.6 MG/DL
BUN SERPL-MCNC: 31 MG/DL
CALCIUM SERPL-MCNC: 9.9 MG/DL
CHLORIDE SERPL-SCNC: 105 MMOL/L
CHOLEST SERPL-MCNC: 171 MG/DL
CO2 SERPL-SCNC: 25 MMOL/L
CREAT SERPL-MCNC: 1.54 MG/DL
GLUCOSE SERPL-MCNC: 105 MG/DL
GLUCOSE SERPL-MCNC: 112 MG/DL
HCYS SERPL-MCNC: 16.4 UMOL/L
HDLC SERPL-MCNC: 53 MG/DL
LDLC SERPL CALC-MCNC: 81 MG/DL
NONHDLC SERPL-MCNC: 118 MG/DL
POTASSIUM SERPL-SCNC: 4.2 MMOL/L
PROT SERPL-MCNC: 7.2 G/DL
SODIUM SERPL-SCNC: 142 MMOL/L
TRIGL SERPL-MCNC: 184 MG/DL

## 2021-08-16 ENCOUNTER — APPOINTMENT (OUTPATIENT)
Dept: HEART AND VASCULAR | Facility: CLINIC | Age: 81
End: 2021-08-16
Payer: MEDICARE

## 2021-08-16 VITALS
OXYGEN SATURATION: 96 % | WEIGHT: 144.04 LBS | BODY MASS INDEX: 24 KG/M2 | HEART RATE: 80 BPM | HEIGHT: 65 IN | TEMPERATURE: 98 F | RESPIRATION RATE: 16 BRPM | DIASTOLIC BLOOD PRESSURE: 72 MMHG | SYSTOLIC BLOOD PRESSURE: 152 MMHG

## 2021-08-16 VITALS — SYSTOLIC BLOOD PRESSURE: 146 MMHG | DIASTOLIC BLOOD PRESSURE: 80 MMHG

## 2021-08-16 PROCEDURE — 99214 OFFICE O/P EST MOD 30 MIN: CPT

## 2021-08-16 PROCEDURE — 93000 ELECTROCARDIOGRAM COMPLETE: CPT

## 2021-08-16 NOTE — PHYSICAL EXAM
[Normal] : well developed, well nourished, no acute distress [Normal S1, S2] : normal S1, S2 [Clear Lung Fields] : clear lung fields [No Edema] : no edema [Moves all extremities] : moves all extremities [Alert and Oriented] : alert and oriented

## 2021-08-16 NOTE — HISTORY OF PRESENT ILLNESS
[FreeTextEntry1] : 80 year male who notes intermittent recurrent right sided chest pain and unchanged with exercise or lying down. He took Pepcid without help. It feels like a needle and last a moment. He recalls a similar pain that improved with Advil. He is amenable to increase in Lipitor to 40 mg daily but not 80 mg

## 2021-08-16 NOTE — ASSESSMENT
[FreeTextEntry1] : CAD, right sided chest pain, hypertension--CXR, increase Metoprolol to 100 mg daily and followup for a stress test

## 2021-08-18 ENCOUNTER — APPOINTMENT (OUTPATIENT)
Dept: HEART AND VASCULAR | Facility: CLINIC | Age: 81
End: 2021-08-18
Payer: MEDICARE

## 2021-08-18 VITALS
WEIGHT: 144 LBS | DIASTOLIC BLOOD PRESSURE: 60 MMHG | SYSTOLIC BLOOD PRESSURE: 128 MMHG | BODY MASS INDEX: 23.99 KG/M2 | RESPIRATION RATE: 16 BRPM | HEIGHT: 65 IN | HEART RATE: 66 BPM | TEMPERATURE: 97.9 F | OXYGEN SATURATION: 96 %

## 2021-08-18 DIAGNOSIS — R07.9 CHEST PAIN, UNSPECIFIED: ICD-10-CM

## 2021-08-18 DIAGNOSIS — I34.0 NONRHEUMATIC MITRAL (VALVE) INSUFFICIENCY: ICD-10-CM

## 2021-08-18 PROCEDURE — 93015 CV STRESS TEST SUPVJ I&R: CPT

## 2021-08-18 PROCEDURE — A9500: CPT

## 2021-08-18 PROCEDURE — 78452 HT MUSCLE IMAGE SPECT MULT: CPT

## 2021-08-18 PROCEDURE — 93306 TTE W/DOPPLER COMPLETE: CPT

## 2021-08-18 NOTE — HISTORY OF PRESENT ILLNESS
[FreeTextEntry1] : 80 year male who had a CXR and returns for an Echo and Nuclear Stress test. He was relieved to hear that his CXR should arthritis of his spine but no lung pathology

## 2021-08-18 NOTE — ASSESSMENT
[FreeTextEntry1] : At the time of the patient's visit an Echocardiogram was performed to evaluate LV function. \par \par At the time of the patient's visit a Nuclear Stress Test was performed to evaluate for exercise induced arrhythmia and ischemia. 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

## 2021-10-19 ENCOUNTER — RX RENEWAL (OUTPATIENT)
Age: 81
End: 2021-10-19

## 2021-11-22 ENCOUNTER — RX RENEWAL (OUTPATIENT)
Age: 81
End: 2021-11-22

## 2022-01-25 ENCOUNTER — RX RENEWAL (OUTPATIENT)
Age: 82
End: 2022-01-25

## 2022-04-08 ENCOUNTER — APPOINTMENT (OUTPATIENT)
Dept: HEART AND VASCULAR | Facility: CLINIC | Age: 82
End: 2022-04-08
Payer: MEDICARE

## 2022-04-08 VITALS
DIASTOLIC BLOOD PRESSURE: 64 MMHG | OXYGEN SATURATION: 95 % | WEIGHT: 150 LBS | HEIGHT: 65 IN | HEART RATE: 69 BPM | SYSTOLIC BLOOD PRESSURE: 120 MMHG | BODY MASS INDEX: 24.99 KG/M2 | RESPIRATION RATE: 16 BRPM | TEMPERATURE: 97.6 F

## 2022-04-08 PROCEDURE — 93000 ELECTROCARDIOGRAM COMPLETE: CPT

## 2022-04-08 PROCEDURE — 99214 OFFICE O/P EST MOD 30 MIN: CPT

## 2022-04-09 NOTE — REVIEW OF SYSTEMS
[Negative] : Heme/Lymph [Blurry Vision] : no blurred vision [Hearing Loss] : no hearing loss [Tinnitus] : no tinnitus [Joint Pain] : no joint pain [Myalgia] : no myalgia [Rash] : no rash [Convulsions] : no convulsions [Depression] : no depression [Anxiety] : no anxiety

## 2022-04-09 NOTE — ASSESSMENT
[FreeTextEntry1] : An EKG was performed to evaluate for arrhythmia and ischemia. \par \par CAD-- fasting labs at [facility]

## 2022-04-09 NOTE — PHYSICAL EXAM
[Normal S1, S2] : normal S1, S2 [Clear Lung Fields] : clear lung fields [Normal] : soft, non-tender, no masses/organomegaly, normal bowel sounds [Normal Gait] : normal gait [No Edema] : no edema [Moves all extremities] : moves all extremities [Alert and Oriented] : alert and oriented

## 2022-04-09 NOTE — HISTORY OF PRESENT ILLNESS
[FreeTextEntry1] : 81 year male who comes for followup. He stopped Niacin due to itching and itching 2 months ago. He denies having any chest pain, SOB, BURGER, dizziness, palpitations, orthopnea, PND or syncope. He [place] to [place] in October 2021 and plans to go to [place]. He is due for colonoscopy but is hesitant due to his age

## 2022-04-13 LAB
ALBUMIN SERPL ELPH-MCNC: 4.7 G/DL
ALP BLD-CCNC: 79 U/L
ALT SERPL-CCNC: 40 U/L
ANION GAP SERPL CALC-SCNC: 16 MMOL/L
AST SERPL-CCNC: 34 U/L
BASOPHILS # BLD AUTO: 0.06 K/UL
BASOPHILS NFR BLD AUTO: 0.8 %
BILIRUB SERPL-MCNC: 0.3 MG/DL
BUN SERPL-MCNC: 33 MG/DL
CALCIUM SERPL-MCNC: 9 MG/DL
CHLORIDE SERPL-SCNC: 105 MMOL/L
CHOLEST SERPL-MCNC: 172 MG/DL
CO2 SERPL-SCNC: 22 MMOL/L
CREAT SERPL-MCNC: 1.4 MG/DL
EGFR: 50 ML/MIN/1.73M2
EOSINOPHIL # BLD AUTO: 0.16 K/UL
EOSINOPHIL NFR BLD AUTO: 2.1 %
ESTIMATED AVERAGE GLUCOSE: 123 MG/DL
GLUCOSE SERPL-MCNC: 105 MG/DL
HBA1C MFR BLD HPLC: 5.9 %
HCT VFR BLD CALC: 39.5 %
HCYS SERPL-MCNC: 14.4 UMOL/L
HDLC SERPL-MCNC: 39 MG/DL
HGB BLD-MCNC: 13 G/DL
IMM GRANULOCYTES NFR BLD AUTO: 0.4 %
LDLC SERPL CALC-MCNC: 99 MG/DL
LYMPHOCYTES # BLD AUTO: 2.47 K/UL
LYMPHOCYTES NFR BLD AUTO: 32.1 %
MAN DIFF?: NORMAL
MCHC RBC-ENTMCNC: 32 PG
MCHC RBC-ENTMCNC: 32.9 GM/DL
MCV RBC AUTO: 97.3 FL
MONOCYTES # BLD AUTO: 0.77 K/UL
MONOCYTES NFR BLD AUTO: 10 %
NEUTROPHILS # BLD AUTO: 4.21 K/UL
NEUTROPHILS NFR BLD AUTO: 54.6 %
NONHDLC SERPL-MCNC: 133 MG/DL
PLATELET # BLD AUTO: 235 K/UL
POTASSIUM SERPL-SCNC: 3.9 MMOL/L
PROT SERPL-MCNC: 6.8 G/DL
PSA SERPL-MCNC: <0.01 NG/ML
RBC # BLD: 4.06 M/UL
RBC # FLD: 12.7 %
SODIUM SERPL-SCNC: 143 MMOL/L
TRIGL SERPL-MCNC: 169 MG/DL
TSH SERPL-ACNC: 4.99 UIU/ML
WBC # FLD AUTO: 7.7 K/UL

## 2022-07-12 ENCOUNTER — RX RENEWAL (OUTPATIENT)
Age: 82
End: 2022-07-12

## 2022-08-10 ENCOUNTER — RX RENEWAL (OUTPATIENT)
Age: 82
End: 2022-08-10

## 2022-09-19 ENCOUNTER — RX RENEWAL (OUTPATIENT)
Age: 82
End: 2022-09-19

## 2022-10-10 ENCOUNTER — RX RENEWAL (OUTPATIENT)
Age: 82
End: 2022-10-10

## 2022-12-19 ENCOUNTER — RX RENEWAL (OUTPATIENT)
Age: 82
End: 2022-12-19

## 2023-01-03 ENCOUNTER — RX RENEWAL (OUTPATIENT)
Age: 83
End: 2023-01-03

## 2023-02-23 ENCOUNTER — APPOINTMENT (OUTPATIENT)
Dept: HEART AND VASCULAR | Facility: CLINIC | Age: 83
End: 2023-02-23
Payer: MEDICARE

## 2023-02-23 ENCOUNTER — NON-APPOINTMENT (OUTPATIENT)
Age: 83
End: 2023-02-23

## 2023-02-23 VITALS
HEART RATE: 64 BPM | SYSTOLIC BLOOD PRESSURE: 134 MMHG | TEMPERATURE: 97 F | WEIGHT: 149 LBS | HEIGHT: 65 IN | DIASTOLIC BLOOD PRESSURE: 64 MMHG | OXYGEN SATURATION: 98 % | BODY MASS INDEX: 24.83 KG/M2 | RESPIRATION RATE: 16 BRPM

## 2023-02-23 DIAGNOSIS — C61 MALIGNANT NEOPLASM OF PROSTATE: ICD-10-CM

## 2023-02-23 PROCEDURE — 36415 COLL VENOUS BLD VENIPUNCTURE: CPT

## 2023-02-23 PROCEDURE — 99214 OFFICE O/P EST MOD 30 MIN: CPT

## 2023-02-23 PROCEDURE — 93000 ELECTROCARDIOGRAM COMPLETE: CPT

## 2023-02-23 NOTE — ASSESSMENT
[FreeTextEntry1] : An EKG was performed to evaluate for arrhythmia and ischemia.\par \par Labs were drawn in the office and sent\par \par CAD--I asked him to return for a stess-echocardiogram\par \par Hypertension--We discussed a goal of /80 or lower in the office. BP  near   goal\par \par Prostate cancer--check PSA and followup with \par \par I encouraged continued risk factor reduction and gradual increase in aerobic activity as tolerated\par \par  31  minutes were spent discussing cardiac risk excluding procedure time \par \par

## 2023-02-23 NOTE — HISTORY OF PRESENT ILLNESS
[FreeTextEntry1] : 82 year male with CAD who comes for labs and followup of his medications. He denies having any chest pain, SOB, BURGER, dizziness, palpitations, orthopnea, PND or syncope

## 2023-02-24 LAB
ALBUMIN SERPL ELPH-MCNC: 4.8 G/DL
ALP BLD-CCNC: 79 U/L
ALT SERPL-CCNC: 29 U/L
ANION GAP SERPL CALC-SCNC: 16 MMOL/L
AST SERPL-CCNC: 24 U/L
BASOPHILS # BLD AUTO: 0.05 K/UL
BASOPHILS NFR BLD AUTO: 0.7 %
BILIRUB SERPL-MCNC: 0.6 MG/DL
BUN SERPL-MCNC: 32 MG/DL
CALCIUM SERPL-MCNC: 9.5 MG/DL
CHLORIDE SERPL-SCNC: 100 MMOL/L
CHOLEST SERPL-MCNC: 185 MG/DL
CO2 SERPL-SCNC: 23 MMOL/L
CREAT SERPL-MCNC: 1.33 MG/DL
EGFR: 53 ML/MIN/1.73M2
EOSINOPHIL # BLD AUTO: 0.13 K/UL
EOSINOPHIL NFR BLD AUTO: 1.8 %
ESTIMATED AVERAGE GLUCOSE: 111 MG/DL
FOLATE SERPL-MCNC: >20 NG/ML
GLUCOSE SERPL-MCNC: 96 MG/DL
HBA1C MFR BLD HPLC: 5.5 %
HCT VFR BLD CALC: 40.3 %
HDLC SERPL-MCNC: 39 MG/DL
HGB BLD-MCNC: 13.3 G/DL
IMM GRANULOCYTES NFR BLD AUTO: 0.3 %
LDLC SERPL CALC-MCNC: 96 MG/DL
LYMPHOCYTES # BLD AUTO: 2.5 K/UL
LYMPHOCYTES NFR BLD AUTO: 35.3 %
MAN DIFF?: NORMAL
MCHC RBC-ENTMCNC: 31.4 PG
MCHC RBC-ENTMCNC: 33 GM/DL
MCV RBC AUTO: 95 FL
MONOCYTES # BLD AUTO: 0.73 K/UL
MONOCYTES NFR BLD AUTO: 10.3 %
NEUTROPHILS # BLD AUTO: 3.65 K/UL
NEUTROPHILS NFR BLD AUTO: 51.6 %
NONHDLC SERPL-MCNC: 146 MG/DL
PLATELET # BLD AUTO: 236 K/UL
POTASSIUM SERPL-SCNC: 3.9 MMOL/L
PROT SERPL-MCNC: 7 G/DL
PSA SERPL-MCNC: <0.01 NG/ML
RBC # BLD: 4.24 M/UL
RBC # FLD: 13.2 %
SODIUM SERPL-SCNC: 138 MMOL/L
TRIGL SERPL-MCNC: 252 MG/DL
TSH SERPL-ACNC: 4 UIU/ML
VIT B12 SERPL-MCNC: 1092 PG/ML
WBC # FLD AUTO: 7.08 K/UL

## 2023-04-08 ENCOUNTER — NON-APPOINTMENT (OUTPATIENT)
Age: 83
End: 2023-04-08

## 2023-04-11 ENCOUNTER — TRANSCRIPTION ENCOUNTER (OUTPATIENT)
Age: 83
End: 2023-04-11

## 2023-04-12 ENCOUNTER — TRANSCRIPTION ENCOUNTER (OUTPATIENT)
Age: 83
End: 2023-04-12

## 2023-05-11 ENCOUNTER — APPOINTMENT (OUTPATIENT)
Dept: HEART AND VASCULAR | Facility: CLINIC | Age: 83
End: 2023-05-11

## 2023-08-14 ENCOUNTER — RX RENEWAL (OUTPATIENT)
Age: 83
End: 2023-08-14

## 2023-09-07 ENCOUNTER — RX RENEWAL (OUTPATIENT)
Age: 83
End: 2023-09-07

## 2023-09-28 ENCOUNTER — RX RENEWAL (OUTPATIENT)
Age: 83
End: 2023-09-28

## 2023-11-28 ENCOUNTER — RX RENEWAL (OUTPATIENT)
Age: 83
End: 2023-11-28

## 2024-01-02 ENCOUNTER — NON-APPOINTMENT (OUTPATIENT)
Age: 84
End: 2024-01-02

## 2024-01-02 ENCOUNTER — APPOINTMENT (OUTPATIENT)
Dept: HEART AND VASCULAR | Facility: CLINIC | Age: 84
End: 2024-01-02
Payer: MEDICARE

## 2024-01-02 VITALS
OXYGEN SATURATION: 98 % | HEART RATE: 79 BPM | BODY MASS INDEX: 25.16 KG/M2 | SYSTOLIC BLOOD PRESSURE: 150 MMHG | HEIGHT: 65 IN | RESPIRATION RATE: 16 BRPM | WEIGHT: 151 LBS | TEMPERATURE: 98 F | DIASTOLIC BLOOD PRESSURE: 62 MMHG

## 2024-01-02 VITALS — SYSTOLIC BLOOD PRESSURE: 138 MMHG | DIASTOLIC BLOOD PRESSURE: 60 MMHG

## 2024-01-02 DIAGNOSIS — I10 ESSENTIAL (PRIMARY) HYPERTENSION: ICD-10-CM

## 2024-01-02 PROCEDURE — 93000 ELECTROCARDIOGRAM COMPLETE: CPT

## 2024-01-02 PROCEDURE — G2211 COMPLEX E/M VISIT ADD ON: CPT

## 2024-01-02 PROCEDURE — 36415 COLL VENOUS BLD VENIPUNCTURE: CPT

## 2024-01-02 PROCEDURE — 99214 OFFICE O/P EST MOD 30 MIN: CPT

## 2024-01-02 NOTE — PHYSICAL EXAM
[Normal S1, S2] : normal S1, S2 [Murmur] : murmur [Clear Lung Fields] : clear lung fields [Normal] : soft, non-tender, no masses/organomegaly, normal bowel sounds [Normal Gait] : normal gait [No Edema] : no edema [Moves all extremities] : moves all extremities [Alert and Oriented] : alert and oriented [de-identified] : II/VI systolic murmur

## 2024-01-02 NOTE — HISTORY OF PRESENT ILLNESS
[FreeTextEntry1] : 83 year male who comes to followup his CAD. He is not interested in a higher dose of Atorvastatin. He denies having any chest pain, SOB, BURGER, dizziness, palpitations, orthopnea, PND or syncope. He is walking 3 miles

## 2024-01-02 NOTE — ASSESSMENT
[FreeTextEntry1] : An EKG was performed to evaluate for arrhythmia and ischemia.  CAD-- I asked him to return for Echo and Nuclear Stress Test  mumur-- echocardiogram  Hypertension--We discussed a goal of /80 or lower in the office. BP  above goal. He does not wish to increase his medications  Labs were drawn in the office and sent  I encouraged continued risk factor reduction and gradual increase in aerobic activity as tolerated  31   minutes were spent discussing cardiac risk excluding procedure time

## 2024-01-03 LAB
ALBUMIN SERPL ELPH-MCNC: 4.5 G/DL
ALP BLD-CCNC: 82 U/L
ALT SERPL-CCNC: 20 U/L
ANION GAP SERPL CALC-SCNC: 12 MMOL/L
AST SERPL-CCNC: 19 U/L
BILIRUB SERPL-MCNC: 0.4 MG/DL
BUN SERPL-MCNC: 26 MG/DL
CALCIUM SERPL-MCNC: 9.8 MG/DL
CHLORIDE SERPL-SCNC: 107 MMOL/L
CHOLEST SERPL-MCNC: 186 MG/DL
CO2 SERPL-SCNC: 26 MMOL/L
CREAT SERPL-MCNC: 1.25 MG/DL
EGFR: 57 ML/MIN/1.73M2
ESTIMATED AVERAGE GLUCOSE: 111 MG/DL
GLUCOSE SERPL-MCNC: 100 MG/DL
HBA1C MFR BLD HPLC: 5.5 %
HDLC SERPL-MCNC: 35 MG/DL
LDLC SERPL CALC-MCNC: 85 MG/DL
NONHDLC SERPL-MCNC: 151 MG/DL
NT-PROBNP SERPL-MCNC: 204 PG/ML
POTASSIUM SERPL-SCNC: 4.4 MMOL/L
PROT SERPL-MCNC: 6.8 G/DL
PSA SERPL-MCNC: <0.01 NG/ML
SODIUM SERPL-SCNC: 145 MMOL/L
TRIGL SERPL-MCNC: 407 MG/DL
TSH SERPL-ACNC: 3.43 UIU/ML

## 2024-01-17 ENCOUNTER — APPOINTMENT (OUTPATIENT)
Dept: HEART AND VASCULAR | Facility: CLINIC | Age: 84
End: 2024-01-17
Payer: MEDICARE

## 2024-01-17 VITALS — HEIGHT: 63 IN | BODY MASS INDEX: 26.75 KG/M2 | WEIGHT: 151 LBS

## 2024-01-17 VITALS
DIASTOLIC BLOOD PRESSURE: 70 MMHG | WEIGHT: 151 LBS | TEMPERATURE: 98.2 F | SYSTOLIC BLOOD PRESSURE: 130 MMHG | HEART RATE: 85 BPM | HEIGHT: 63 IN | BODY MASS INDEX: 26.75 KG/M2

## 2024-01-17 DIAGNOSIS — E78.5 HYPERLIPIDEMIA, UNSPECIFIED: ICD-10-CM

## 2024-01-17 DIAGNOSIS — I25.10 ATHEROSCLEROTIC HEART DISEASE OF NATIVE CORONARY ARTERY W/OUT ANGINA PECTORIS: ICD-10-CM

## 2024-01-17 PROCEDURE — A9500: CPT

## 2024-01-17 PROCEDURE — 99212 OFFICE O/P EST SF 10 MIN: CPT | Mod: 25

## 2024-01-17 PROCEDURE — 93015 CV STRESS TEST SUPVJ I&R: CPT

## 2024-01-17 PROCEDURE — 78452 HT MUSCLE IMAGE SPECT MULT: CPT

## 2024-01-17 NOTE — PHYSICAL EXAM
[Normal] : well developed, well nourished, no acute distress [Normal S1, S2] : normal S1, S2 [Murmur] : murmur [Clear Lung Fields] : clear lung fields [Normal Gait] : normal gait [No Edema] : no edema [Moves all extremities] : moves all extremities [Alert and Oriented] : alert and oriented [de-identified] : II/VI systolic murmur

## 2024-01-17 NOTE — HISTORY OF PRESENT ILLNESS
[FreeTextEntry1] : 83 year male who comes for followup of his CAD. We reviewed that his labs were non-fasting

## 2024-01-17 NOTE — ASSESSMENT
[FreeTextEntry1] : At the time of the patient's visit an Echocardiogram was performed to evaluate LV function. At the time of the visit the results were reviewed with patient  At the time of the patient's visit a Nuclear Stress Test was performed to evaluate for exercise induced arrhythmia and ischemia. At the time of the visit the results were reviewed with patient   Lipids and glucose to be drawn fasting at [facility]  I encouraged continued risk factor reduction and gradual increase in aerobic activity as tolerated  19   minutes were spent discussing cardiac risk excluding procedure time

## 2024-01-19 ENCOUNTER — RX RENEWAL (OUTPATIENT)
Age: 84
End: 2024-01-19

## 2024-01-24 LAB
CHOLEST SERPL-MCNC: 173 MG/DL
GLUCOSE BS SERPL-MCNC: 91 MG/DL
HDLC SERPL-MCNC: 37 MG/DL
LDLC SERPL CALC-MCNC: 96 MG/DL
NONHDLC SERPL-MCNC: 135 MG/DL
TRIGL SERPL-MCNC: 227 MG/DL

## 2024-03-05 ENCOUNTER — RX RENEWAL (OUTPATIENT)
Age: 84
End: 2024-03-05

## 2024-03-11 ENCOUNTER — RX RENEWAL (OUTPATIENT)
Age: 84
End: 2024-03-11

## 2024-03-17 NOTE — ED PROVIDER NOTE - ATTESTATION, MLM
I have reviewed and confirmed nurses' notes for patient's medications, allergies, medical history, and surgical history. PEDIATRICS

## 2024-07-25 ENCOUNTER — NON-APPOINTMENT (OUTPATIENT)
Age: 84
End: 2024-07-25

## 2024-07-25 ENCOUNTER — APPOINTMENT (OUTPATIENT)
Dept: HEART AND VASCULAR | Facility: CLINIC | Age: 84
End: 2024-07-25
Payer: MEDICARE

## 2024-07-25 VITALS
OXYGEN SATURATION: 98 % | WEIGHT: 144 LBS | HEART RATE: 65 BPM | HEIGHT: 63 IN | DIASTOLIC BLOOD PRESSURE: 70 MMHG | BODY MASS INDEX: 25.52 KG/M2 | SYSTOLIC BLOOD PRESSURE: 136 MMHG | TEMPERATURE: 98.9 F

## 2024-07-25 DIAGNOSIS — E78.5 HYPERLIPIDEMIA, UNSPECIFIED: ICD-10-CM

## 2024-07-25 DIAGNOSIS — I10 ESSENTIAL (PRIMARY) HYPERTENSION: ICD-10-CM

## 2024-07-25 DIAGNOSIS — I25.10 ATHEROSCLEROTIC HEART DISEASE OF NATIVE CORONARY ARTERY W/OUT ANGINA PECTORIS: ICD-10-CM

## 2024-07-25 PROCEDURE — 36415 COLL VENOUS BLD VENIPUNCTURE: CPT

## 2024-07-25 PROCEDURE — 99214 OFFICE O/P EST MOD 30 MIN: CPT

## 2024-07-25 PROCEDURE — 93000 ELECTROCARDIOGRAM COMPLETE: CPT

## 2024-07-25 PROCEDURE — G2211 COMPLEX E/M VISIT ADD ON: CPT

## 2024-07-25 NOTE — PHYSICAL EXAM
[Normal] : well developed, well nourished, no acute distress [Normal S1, S2] : normal S1, S2 [Murmur] : murmur [Clear Lung Fields] : clear lung fields [Normal Gait] : normal gait [No Edema] : no edema [Moves all extremities] : moves all extremities [Alert and Oriented] : alert and oriented [de-identified] : II/VI systolic murmur

## 2024-07-25 NOTE — ASSESSMENT
[FreeTextEntry1] : An EKG was performed to evaluate for arrhythmia and ischemia.  Labs were drawn in the office and sent  CAD with high trig and low HDL--We discussed risks of bruising and bleeding on ASA and Omega 3  I encouraged continued risk factor reduction and gradual increase in aerobic activity as tolerated  31   minutes were spent discussing cardiac risk excluding procedure time

## 2024-07-26 LAB
ALBUMIN SERPL ELPH-MCNC: 5 G/DL
ALP BLD-CCNC: 81 U/L
ALT SERPL-CCNC: 21 U/L
ANION GAP SERPL CALC-SCNC: 14 MMOL/L
AST SERPL-CCNC: 25 U/L
BILIRUB SERPL-MCNC: 0.7 MG/DL
BUN SERPL-MCNC: 28 MG/DL
CALCIUM SERPL-MCNC: 9.5 MG/DL
CHLORIDE SERPL-SCNC: 105 MMOL/L
CHOLEST SERPL-MCNC: 183 MG/DL
CO2 SERPL-SCNC: 24 MMOL/L
CREAT SERPL-MCNC: 1.43 MG/DL
EGFR: 49 ML/MIN/1.73M2
ESTIMATED AVERAGE GLUCOSE: 120 MG/DL
GLUCOSE SERPL-MCNC: 104 MG/DL
HBA1C MFR BLD HPLC: 5.8 %
HDLC SERPL-MCNC: 42 MG/DL
LDLC SERPL CALC-MCNC: 107 MG/DL
NONHDLC SERPL-MCNC: 141 MG/DL
POTASSIUM SERPL-SCNC: 4.4 MMOL/L
PROT SERPL-MCNC: 7.2 G/DL
SODIUM SERPL-SCNC: 144 MMOL/L
TRIGL SERPL-MCNC: 197 MG/DL

## 2024-08-05 ENCOUNTER — RX RENEWAL (OUTPATIENT)
Age: 84
End: 2024-08-05

## 2024-10-21 ENCOUNTER — APPOINTMENT (OUTPATIENT)
Dept: GERIATRICS | Facility: CLINIC | Age: 84
End: 2024-10-21
Payer: MEDICARE

## 2024-10-21 ENCOUNTER — NON-APPOINTMENT (OUTPATIENT)
Age: 84
End: 2024-10-21

## 2024-10-21 VITALS
OXYGEN SATURATION: 98 % | HEIGHT: 63 IN | DIASTOLIC BLOOD PRESSURE: 73 MMHG | TEMPERATURE: 98.1 F | SYSTOLIC BLOOD PRESSURE: 161 MMHG | BODY MASS INDEX: 25.34 KG/M2 | HEART RATE: 66 BPM | WEIGHT: 143 LBS

## 2024-10-21 DIAGNOSIS — I63.9 CEREBRAL INFARCTION, UNSPECIFIED: ICD-10-CM

## 2024-10-21 DIAGNOSIS — I34.0 NONRHEUMATIC MITRAL (VALVE) INSUFFICIENCY: ICD-10-CM

## 2024-10-21 DIAGNOSIS — I10 ESSENTIAL (PRIMARY) HYPERTENSION: ICD-10-CM

## 2024-10-21 DIAGNOSIS — I25.10 ATHEROSCLEROTIC HEART DISEASE OF NATIVE CORONARY ARTERY W/OUT ANGINA PECTORIS: ICD-10-CM

## 2024-10-21 DIAGNOSIS — E78.5 HYPERLIPIDEMIA, UNSPECIFIED: ICD-10-CM

## 2024-10-21 DIAGNOSIS — R73.03 PREDIABETES.: ICD-10-CM

## 2024-10-21 DIAGNOSIS — C61 MALIGNANT NEOPLASM OF PROSTATE: ICD-10-CM

## 2024-10-21 DIAGNOSIS — N18.30 CHRONIC KIDNEY DISEASE, STAGE 3 UNSPECIFIED: ICD-10-CM

## 2024-10-21 PROCEDURE — 99205 OFFICE O/P NEW HI 60 MIN: CPT

## 2024-10-21 PROCEDURE — 36415 COLL VENOUS BLD VENIPUNCTURE: CPT

## 2024-10-22 LAB
25(OH)D3 SERPL-MCNC: 49.6 NG/ML
ALBUMIN SERPL ELPH-MCNC: 4.6 G/DL
ALP BLD-CCNC: 79 U/L
ALT SERPL-CCNC: 27 U/L
ANION GAP SERPL CALC-SCNC: 12 MMOL/L
AST SERPL-CCNC: 25 U/L
BILIRUB SERPL-MCNC: 0.4 MG/DL
BUN SERPL-MCNC: 29 MG/DL
CALCIUM SERPL-MCNC: 9.6 MG/DL
CHLORIDE SERPL-SCNC: 103 MMOL/L
CHOLEST SERPL-MCNC: 164 MG/DL
CO2 SERPL-SCNC: 26 MMOL/L
CREAT SERPL-MCNC: 1.36 MG/DL
EGFR: 51 ML/MIN/1.73M2
ESTIMATED AVERAGE GLUCOSE: 117 MG/DL
GLUCOSE SERPL-MCNC: 109 MG/DL
HBA1C MFR BLD HPLC: 5.7 %
HCT VFR BLD CALC: 39.4 %
HDLC SERPL-MCNC: 47 MG/DL
HGB BLD-MCNC: 13.2 G/DL
LDLC SERPL CALC-MCNC: 87 MG/DL
MCHC RBC-ENTMCNC: 32.2 PG
MCHC RBC-ENTMCNC: 33.5 GM/DL
MCV RBC AUTO: 96.1 FL
NONHDLC SERPL-MCNC: 117 MG/DL
PLATELET # BLD AUTO: 245 K/UL
POTASSIUM SERPL-SCNC: 4.2 MMOL/L
PROT SERPL-MCNC: 6.7 G/DL
RBC # BLD: 4.1 M/UL
RBC # FLD: 13.4 %
SODIUM SERPL-SCNC: 141 MMOL/L
TRIGL SERPL-MCNC: 171 MG/DL
TSH SERPL-ACNC: 3.77 UIU/ML
VIT B12 SERPL-MCNC: 1419 PG/ML
WBC # FLD AUTO: 7.3 K/UL

## 2024-11-21 ENCOUNTER — APPOINTMENT (OUTPATIENT)
Dept: GERIATRICS | Facility: CLINIC | Age: 84
End: 2024-11-21
Payer: MEDICARE

## 2024-11-21 DIAGNOSIS — Z71.89 OTHER SPECIFIED COUNSELING: ICD-10-CM

## 2024-11-21 DIAGNOSIS — Z71.85 ENCOUNTER FOR IMMUNIZATION SAFETY COUNSELING: ICD-10-CM

## 2024-11-21 DIAGNOSIS — I10 ESSENTIAL (PRIMARY) HYPERTENSION: ICD-10-CM

## 2024-11-21 PROCEDURE — 99443: CPT | Mod: 93

## 2024-12-27 ENCOUNTER — NON-APPOINTMENT (OUTPATIENT)
Age: 84
End: 2024-12-27

## 2025-01-10 ENCOUNTER — RX RENEWAL (OUTPATIENT)
Age: 85
End: 2025-01-10

## 2025-01-19 ENCOUNTER — NON-APPOINTMENT (OUTPATIENT)
Age: 85
End: 2025-01-19

## 2025-02-21 ENCOUNTER — RX RENEWAL (OUTPATIENT)
Age: 85
End: 2025-02-21

## 2025-03-03 ENCOUNTER — RX RENEWAL (OUTPATIENT)
Age: 85
End: 2025-03-03

## 2025-03-21 ENCOUNTER — NON-APPOINTMENT (OUTPATIENT)
Age: 85
End: 2025-03-21

## 2025-03-21 ENCOUNTER — APPOINTMENT (OUTPATIENT)
Dept: GERIATRICS | Facility: CLINIC | Age: 85
End: 2025-03-21
Payer: MEDICARE

## 2025-03-21 VITALS — SYSTOLIC BLOOD PRESSURE: 125 MMHG | DIASTOLIC BLOOD PRESSURE: 70 MMHG | OXYGEN SATURATION: 96 % | HEART RATE: 75 BPM

## 2025-03-21 VITALS — BODY MASS INDEX: 24.8 KG/M2 | WEIGHT: 140 LBS

## 2025-03-21 VITALS — TEMPERATURE: 98.1 F

## 2025-03-21 DIAGNOSIS — R73.03 PREDIABETES.: ICD-10-CM

## 2025-03-21 DIAGNOSIS — Z23 ENCOUNTER FOR IMMUNIZATION: ICD-10-CM

## 2025-03-21 DIAGNOSIS — I25.10 ATHEROSCLEROTIC HEART DISEASE OF NATIVE CORONARY ARTERY W/OUT ANGINA PECTORIS: ICD-10-CM

## 2025-03-21 DIAGNOSIS — I63.9 CEREBRAL INFARCTION, UNSPECIFIED: ICD-10-CM

## 2025-03-21 DIAGNOSIS — R79.89 OTHER SPECIFIED ABNORMAL FINDINGS OF BLOOD CHEMISTRY: ICD-10-CM

## 2025-03-21 DIAGNOSIS — Z86.39 PERSONAL HISTORY OF OTHER ENDOCRINE, NUTRITIONAL AND METABOLIC DISEASE: ICD-10-CM

## 2025-03-21 DIAGNOSIS — C61 MALIGNANT NEOPLASM OF PROSTATE: ICD-10-CM

## 2025-03-21 DIAGNOSIS — N18.30 CHRONIC KIDNEY DISEASE, STAGE 3 UNSPECIFIED: ICD-10-CM

## 2025-03-21 DIAGNOSIS — I10 ESSENTIAL (PRIMARY) HYPERTENSION: ICD-10-CM

## 2025-03-21 DIAGNOSIS — E78.5 HYPERLIPIDEMIA, UNSPECIFIED: ICD-10-CM

## 2025-03-21 PROCEDURE — 36415 COLL VENOUS BLD VENIPUNCTURE: CPT

## 2025-03-21 PROCEDURE — G0009: CPT

## 2025-03-21 PROCEDURE — 90677 PCV20 VACCINE IM: CPT

## 2025-03-21 PROCEDURE — 99214 OFFICE O/P EST MOD 30 MIN: CPT

## 2025-03-21 PROCEDURE — G2211 COMPLEX E/M VISIT ADD ON: CPT

## 2025-03-22 DIAGNOSIS — D64.9 ANEMIA, UNSPECIFIED: ICD-10-CM

## 2025-03-22 LAB
25(OH)D3 SERPL-MCNC: 56.1 NG/ML
CHOLEST SERPL-MCNC: 170 MG/DL
ESTIMATED AVERAGE GLUCOSE: 111 MG/DL
HBA1C MFR BLD HPLC: 5.5 %
HCT VFR BLD CALC: 36.5 %
HCYS SERPL-MCNC: 14.5 UMOL/L
HDLC SERPL-MCNC: 43 MG/DL
HGB BLD-MCNC: 12.2 G/DL
LDLC SERPL-MCNC: 99 MG/DL
MCHC RBC-ENTMCNC: 31.6 PG
MCHC RBC-ENTMCNC: 33.4 G/DL
MCV RBC AUTO: 94.6 FL
NONHDLC SERPL-MCNC: 128 MG/DL
PLATELET # BLD AUTO: 252 K/UL
PSA FREE FLD-MCNC: NORMAL %
PSA FREE SERPL-MCNC: <0.01 NG/ML
PSA SERPL-MCNC: <0.01 NG/ML
RBC # BLD: 3.86 M/UL
RBC # FLD: 13.6 %
TRIGL SERPL-MCNC: 165 MG/DL
VIT B12 SERPL-MCNC: 861 PG/ML
WBC # FLD AUTO: 7.66 K/UL

## 2025-03-23 LAB
FERRITIN SERPL-MCNC: 138 NG/ML
FOLATE SERPL-MCNC: >20 NG/ML

## 2025-04-07 ENCOUNTER — NON-APPOINTMENT (OUTPATIENT)
Age: 85
End: 2025-04-07

## 2025-04-07 ENCOUNTER — APPOINTMENT (OUTPATIENT)
Dept: HEART AND VASCULAR | Facility: CLINIC | Age: 85
End: 2025-04-07
Payer: MEDICARE

## 2025-04-07 VITALS
TEMPERATURE: 98.4 F | HEIGHT: 63 IN | OXYGEN SATURATION: 98 % | DIASTOLIC BLOOD PRESSURE: 64 MMHG | HEART RATE: 56 BPM | SYSTOLIC BLOOD PRESSURE: 130 MMHG | WEIGHT: 138 LBS | BODY MASS INDEX: 24.45 KG/M2

## 2025-04-07 DIAGNOSIS — E78.5 HYPERLIPIDEMIA, UNSPECIFIED: ICD-10-CM

## 2025-04-07 DIAGNOSIS — I25.10 ATHEROSCLEROTIC HEART DISEASE OF NATIVE CORONARY ARTERY W/OUT ANGINA PECTORIS: ICD-10-CM

## 2025-04-07 DIAGNOSIS — I10 ESSENTIAL (PRIMARY) HYPERTENSION: ICD-10-CM

## 2025-04-07 PROCEDURE — 99214 OFFICE O/P EST MOD 30 MIN: CPT

## 2025-04-07 PROCEDURE — 93000 ELECTROCARDIOGRAM COMPLETE: CPT

## 2025-04-07 PROCEDURE — G2211 COMPLEX E/M VISIT ADD ON: CPT

## 2025-04-15 ENCOUNTER — RX RENEWAL (OUTPATIENT)
Age: 85
End: 2025-04-15

## 2025-05-22 ENCOUNTER — RX RENEWAL (OUTPATIENT)
Age: 85
End: 2025-05-22

## 2025-06-09 ENCOUNTER — RX RENEWAL (OUTPATIENT)
Age: 85
End: 2025-06-09

## 2025-06-20 ENCOUNTER — APPOINTMENT (OUTPATIENT)
Dept: GERIATRICS | Facility: CLINIC | Age: 85
End: 2025-06-20
Payer: MEDICARE

## 2025-06-20 VITALS
WEIGHT: 142 LBS | HEART RATE: 67 BPM | HEIGHT: 63 IN | DIASTOLIC BLOOD PRESSURE: 68 MMHG | TEMPERATURE: 97.7 F | OXYGEN SATURATION: 97 % | SYSTOLIC BLOOD PRESSURE: 122 MMHG | BODY MASS INDEX: 25.16 KG/M2

## 2025-06-20 PROCEDURE — 99214 OFFICE O/P EST MOD 30 MIN: CPT

## 2025-06-20 PROCEDURE — G2211 COMPLEX E/M VISIT ADD ON: CPT

## 2025-08-26 ENCOUNTER — RX RENEWAL (OUTPATIENT)
Age: 85
End: 2025-08-26

## 2025-09-02 ENCOUNTER — APPOINTMENT (OUTPATIENT)
Dept: GERIATRICS | Facility: CLINIC | Age: 85
End: 2025-09-02
Payer: MEDICARE

## 2025-09-02 VITALS
SYSTOLIC BLOOD PRESSURE: 119 MMHG | DIASTOLIC BLOOD PRESSURE: 70 MMHG | WEIGHT: 139 LBS | HEART RATE: 52 BPM | BODY MASS INDEX: 24.62 KG/M2 | OXYGEN SATURATION: 98 % | TEMPERATURE: 97.9 F

## 2025-09-02 DIAGNOSIS — R79.89 OTHER SPECIFIED ABNORMAL FINDINGS OF BLOOD CHEMISTRY: ICD-10-CM

## 2025-09-02 DIAGNOSIS — D64.9 ANEMIA, UNSPECIFIED: ICD-10-CM

## 2025-09-02 DIAGNOSIS — I25.10 ATHEROSCLEROTIC HEART DISEASE OF NATIVE CORONARY ARTERY W/OUT ANGINA PECTORIS: ICD-10-CM

## 2025-09-02 DIAGNOSIS — C61 MALIGNANT NEOPLASM OF PROSTATE: ICD-10-CM

## 2025-09-02 DIAGNOSIS — N18.30 CHRONIC KIDNEY DISEASE, STAGE 3 UNSPECIFIED: ICD-10-CM

## 2025-09-02 DIAGNOSIS — I10 ESSENTIAL (PRIMARY) HYPERTENSION: ICD-10-CM

## 2025-09-02 DIAGNOSIS — Z71.85 ENCOUNTER FOR IMMUNIZATION SAFETY COUNSELING: ICD-10-CM

## 2025-09-02 DIAGNOSIS — R73.03 PREDIABETES.: ICD-10-CM

## 2025-09-02 DIAGNOSIS — E78.5 HYPERLIPIDEMIA, UNSPECIFIED: ICD-10-CM

## 2025-09-02 LAB
25(OH)D3 SERPL-MCNC: 55.4 NG/ML
ALBUMIN SERPL ELPH-MCNC: 4.7 G/DL
ALP BLD-CCNC: 142 U/L
ALT SERPL-CCNC: 45 U/L
ANION GAP SERPL CALC-SCNC: 14 MMOL/L
AST SERPL-CCNC: 38 U/L
BILIRUB SERPL-MCNC: 0.6 MG/DL
BUN SERPL-MCNC: 29 MG/DL
CALCIUM SERPL-MCNC: 9.9 MG/DL
CHLORIDE SERPL-SCNC: 105 MMOL/L
CHOLEST SERPL-MCNC: 154 MG/DL
CO2 SERPL-SCNC: 24 MMOL/L
CREAT SERPL-MCNC: 1.25 MG/DL
EGFRCR SERPLBLD CKD-EPI 2021: 57 ML/MIN/1.73M2
FERRITIN SERPL-MCNC: 162 NG/ML
FOLATE SERPL-MCNC: >20 NG/ML
GLUCOSE SERPL-MCNC: 97 MG/DL
HCT VFR BLD CALC: 37.1 %
HDLC SERPL-MCNC: 40 MG/DL
HGB BLD-MCNC: 12.5 G/DL
LDLC SERPL-MCNC: 89 MG/DL
MCHC RBC-ENTMCNC: 31.9 PG
MCHC RBC-ENTMCNC: 33.7 G/DL
MCV RBC AUTO: 94.6 FL
NONHDLC SERPL-MCNC: 114 MG/DL
PLATELET # BLD AUTO: 244 K/UL
POTASSIUM SERPL-SCNC: 4.6 MMOL/L
PROT SERPL-MCNC: 7.2 G/DL
RBC # BLD: 3.92 M/UL
RBC # FLD: 13.1 %
SODIUM SERPL-SCNC: 143 MMOL/L
TRIGL SERPL-MCNC: 137 MG/DL
VIT B12 SERPL-MCNC: 1534 PG/ML
WBC # FLD AUTO: 8.06 K/UL

## 2025-09-02 PROCEDURE — 99214 OFFICE O/P EST MOD 30 MIN: CPT

## 2025-09-02 PROCEDURE — 36415 COLL VENOUS BLD VENIPUNCTURE: CPT

## 2025-09-03 LAB
ESTIMATED AVERAGE GLUCOSE: 117 MG/DL
HBA1C MFR BLD HPLC: 5.7 %